# Patient Record
Sex: MALE | Race: WHITE | NOT HISPANIC OR LATINO | Employment: OTHER | URBAN - METROPOLITAN AREA
[De-identification: names, ages, dates, MRNs, and addresses within clinical notes are randomized per-mention and may not be internally consistent; named-entity substitution may affect disease eponyms.]

---

## 2017-07-31 ENCOUNTER — APPOINTMENT (EMERGENCY)
Dept: RADIOLOGY | Facility: HOSPITAL | Age: 82
End: 2017-07-31
Payer: MEDICARE

## 2017-07-31 ENCOUNTER — HOSPITAL ENCOUNTER (EMERGENCY)
Facility: HOSPITAL | Age: 82
Discharge: HOME | End: 2017-07-31
Attending: EMERGENCY MEDICINE
Payer: MEDICARE

## 2017-07-31 VITALS
RESPIRATION RATE: 18 BRPM | DIASTOLIC BLOOD PRESSURE: 80 MMHG | TEMPERATURE: 98.2 F | SYSTOLIC BLOOD PRESSURE: 142 MMHG | OXYGEN SATURATION: 98 % | HEART RATE: 76 BPM | WEIGHT: 143.96 LBS
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
## 2017-07-31 DIAGNOSIS — S70.00XA CONTUSION, HIP: ICD-10-CM

## 2017-07-31 DIAGNOSIS — S46.919A SHOULDER STRAIN: ICD-10-CM

## 2017-07-31 PROCEDURE — 99284 EMERGENCY DEPT VISIT MOD MDM: CPT

## 2017-07-31 PROCEDURE — 72100 X-RAY EXAM L-S SPINE 2/3 VWS: CPT

## 2017-07-31 PROCEDURE — 73030 X-RAY EXAM OF SHOULDER: CPT

## 2017-07-31 PROCEDURE — 72170 X-RAY EXAM OF PELVIS: CPT

## 2017-07-31 RX ORDER — OXYCODONE HYDROCHLORIDE AND ACETAMINOPHEN 5; 325 MG/1; MG/1
1 TABLET ORAL EVERY 8 HOURS PRN
Qty: 12 TABLET | Refills: 0 | Status: SHIPPED | OUTPATIENT
Start: 2017-07-31 | End: 2017-08-10

## 2017-07-31 RX ORDER — OXYCODONE HYDROCHLORIDE AND ACETAMINOPHEN 5; 325 MG/1; MG/1
1 TABLET ORAL ONCE
Status: COMPLETED | OUTPATIENT
Start: 2017-07-31 | End: 2017-07-31

## 2017-07-31 RX ADMIN — OXYCODONE HYDROCHLORIDE AND ACETAMINOPHEN 1 TABLET: 5; 325 TABLET ORAL at 19:41

## 2017-08-17 ENCOUNTER — APPOINTMENT (EMERGENCY)
Dept: CT IMAGING | Facility: HOSPITAL | Age: 82
End: 2017-08-17
Payer: MEDICARE

## 2017-08-17 ENCOUNTER — HOSPITAL ENCOUNTER (EMERGENCY)
Facility: HOSPITAL | Age: 82
Discharge: HOME | End: 2017-08-17
Attending: EMERGENCY MEDICINE | Admitting: EMERGENCY MEDICINE
Payer: MEDICARE

## 2017-08-17 VITALS
BODY MASS INDEX: 24.22 KG/M2 | RESPIRATION RATE: 18 BRPM | OXYGEN SATURATION: 99 % | HEART RATE: 79 BPM | WEIGHT: 159.83 LBS | TEMPERATURE: 97.9 F | DIASTOLIC BLOOD PRESSURE: 91 MMHG | HEIGHT: 68 IN | SYSTOLIC BLOOD PRESSURE: 188 MMHG

## 2017-08-17 DIAGNOSIS — S09.90XA CLOSED HEAD INJURY, INITIAL ENCOUNTER: ICD-10-CM
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
## 2017-08-17 PROCEDURE — 99284 EMERGENCY DEPT VISIT MOD MDM: CPT

## 2017-08-17 PROCEDURE — 70450 CT HEAD/BRAIN W/O DYE: CPT

## 2017-08-17 RX ORDER — MEMANTINE HYDROCHLORIDE 5 MG/1
10 TABLET ORAL
COMMUNITY
End: 2017-08-18

## 2017-08-17 RX ORDER — ASPIRIN 81 MG/1
81 TABLET, CHEWABLE ORAL DAILY
COMMUNITY
End: 2017-10-27 | Stop reason: HOSPADM

## 2017-08-17 RX ORDER — ESOMEPRAZOLE MAGNESIUM 40 MG/1
40 CAPSULE, DELAYED RELEASE ORAL
COMMUNITY

## 2017-08-17 RX ORDER — PRAVASTATIN SODIUM 40 MG
40 TABLET ORAL DAILY
COMMUNITY

## 2017-08-17 RX ORDER — MEMANTINE HYDROCHLORIDE 5 MG/1
5 TABLET ORAL DAILY
COMMUNITY
End: 2017-08-18

## 2017-08-18 ENCOUNTER — HOSPITAL ENCOUNTER (EMERGENCY)
Facility: HOSPITAL | Age: 82
Discharge: HOME | End: 2017-08-18
Attending: EMERGENCY MEDICINE | Admitting: EMERGENCY MEDICINE
Payer: MEDICARE

## 2017-08-18 ENCOUNTER — APPOINTMENT (EMERGENCY)
Dept: RADIOLOGY | Facility: HOSPITAL | Age: 82
End: 2017-08-18
Payer: MEDICARE

## 2017-08-18 ENCOUNTER — APPOINTMENT (EMERGENCY)
Dept: CT IMAGING | Facility: HOSPITAL | Age: 82
End: 2017-08-18
Payer: MEDICARE

## 2017-08-18 VITALS
TEMPERATURE: 97.8 F | RESPIRATION RATE: 16 BRPM | DIASTOLIC BLOOD PRESSURE: 28 MMHG | SYSTOLIC BLOOD PRESSURE: 138 MMHG | BODY MASS INDEX: 21.52 KG/M2 | OXYGEN SATURATION: 97 % | WEIGHT: 141.54 LBS | HEART RATE: 94 BPM

## 2017-08-18 DIAGNOSIS — T14.8XXA INTRAMUSCULAR HEMATOMA: Primary | ICD-10-CM

## 2017-08-18 LAB
ALBUMIN SERPL BCP-MCNC: 3.2 G/DL (ref 3.5–5)
ALP SERPL-CCNC: 49 U/L (ref 46–116)
ALT SERPL W P-5'-P-CCNC: 21 U/L (ref 12–78)
ANION GAP SERPL CALCULATED.3IONS-SCNC: 7 MMOL/L (ref 4–13)
AST SERPL W P-5'-P-CCNC: 21 U/L (ref 5–45)
BACTERIA UR QL AUTO: ABNORMAL /HPF
BASOPHILS # BLD AUTO: 0.02 THOUSANDS/ΜL (ref 0–0.1)
BASOPHILS NFR BLD AUTO: 0 % (ref 0–1)
BILIRUB SERPL-MCNC: 0.6 MG/DL (ref 0.2–1)
BILIRUB UR QL STRIP: NEGATIVE
BUN SERPL-MCNC: 19 MG/DL (ref 5–25)
CALCIUM SERPL-MCNC: 8.5 MG/DL (ref 8.3–10.1)
CHLORIDE SERPL-SCNC: 104 MMOL/L (ref 100–108)
CLARITY UR: CLEAR
CO2 SERPL-SCNC: 30 MMOL/L (ref 21–32)
COLOR UR: YELLOW
CREAT SERPL-MCNC: 0.82 MG/DL (ref 0.6–1.3)
EOSINOPHIL # BLD AUTO: 0.01 THOUSAND/ΜL (ref 0–0.61)
EOSINOPHIL NFR BLD AUTO: 0 % (ref 0–6)
ERYTHROCYTE [DISTWIDTH] IN BLOOD BY AUTOMATED COUNT: 13.4 % (ref 11.6–15.1)
GFR SERPL CREATININE-BSD FRML MDRD: 84 ML/MIN/1.73SQ M
GLUCOSE SERPL-MCNC: 110 MG/DL (ref 65–140)
GLUCOSE UR STRIP-MCNC: NEGATIVE MG/DL
HCT VFR BLD AUTO: 36.7 % (ref 36.5–49.3)
HGB BLD-MCNC: 12.2 G/DL (ref 12–17)
HGB UR QL STRIP.AUTO: ABNORMAL
KETONES UR STRIP-MCNC: ABNORMAL MG/DL
LEUKOCYTE ESTERASE UR QL STRIP: NEGATIVE
LYMPHOCYTES # BLD AUTO: 1.65 THOUSANDS/ΜL (ref 0.6–4.47)
LYMPHOCYTES NFR BLD AUTO: 14 % (ref 14–44)
MCH RBC QN AUTO: 29.7 PG (ref 26.8–34.3)
MCHC RBC AUTO-ENTMCNC: 33.2 G/DL (ref 31.4–37.4)
MCV RBC AUTO: 89 FL (ref 82–98)
MONOCYTES # BLD AUTO: 0.88 THOUSAND/ΜL (ref 0.17–1.22)
MONOCYTES NFR BLD AUTO: 7 % (ref 4–12)
NEUTROPHILS # BLD AUTO: 9.44 THOUSANDS/ΜL (ref 1.85–7.62)
NEUTS SEG NFR BLD AUTO: 79 % (ref 43–75)
NITRITE UR QL STRIP: NEGATIVE
NON-SQ EPI CELLS URNS QL MICRO: ABNORMAL /HPF
PH UR STRIP.AUTO: 6.5 [PH] (ref 4.5–8)
PLATELET # BLD AUTO: 240 THOUSANDS/UL (ref 149–390)
PMV BLD AUTO: 10.2 FL (ref 8.9–12.7)
POTASSIUM SERPL-SCNC: 3.9 MMOL/L (ref 3.5–5.3)
PROT SERPL-MCNC: 6.9 G/DL (ref 6.4–8.2)
PROT UR STRIP-MCNC: NEGATIVE MG/DL
RBC # BLD AUTO: 4.11 MILLION/UL (ref 3.88–5.62)
RBC #/AREA URNS AUTO: ABNORMAL /HPF
SODIUM SERPL-SCNC: 141 MMOL/L (ref 136–145)
SP GR UR STRIP.AUTO: 1.01 (ref 1–1.03)
TROPONIN I SERPL-MCNC: <0.02 NG/ML
UROBILINOGEN UR QL STRIP.AUTO: 0.2 E.U./DL
WBC # BLD AUTO: 12 THOUSAND/UL (ref 4.31–10.16)
WBC #/AREA URNS AUTO: ABNORMAL /HPF

## 2017-08-18 PROCEDURE — 73502 X-RAY EXAM HIP UNI 2-3 VIEWS: CPT

## 2017-08-18 PROCEDURE — 36415 COLL VENOUS BLD VENIPUNCTURE: CPT | Performed by: EMERGENCY MEDICINE

## 2017-08-18 PROCEDURE — 84484 ASSAY OF TROPONIN QUANT: CPT | Performed by: EMERGENCY MEDICINE

## 2017-08-18 PROCEDURE — 71010 HB CHEST X-RAY 1 VIEW FRONTAL: CPT

## 2017-08-18 PROCEDURE — 85025 COMPLETE CBC W/AUTO DIFF WBC: CPT | Performed by: EMERGENCY MEDICINE

## 2017-08-18 PROCEDURE — 80053 COMPREHEN METABOLIC PANEL: CPT | Performed by: EMERGENCY MEDICINE

## 2017-08-18 PROCEDURE — 99285 EMERGENCY DEPT VISIT HI MDM: CPT

## 2017-08-18 PROCEDURE — 73552 X-RAY EXAM OF FEMUR 2/>: CPT

## 2017-08-18 PROCEDURE — 81001 URINALYSIS AUTO W/SCOPE: CPT | Performed by: EMERGENCY MEDICINE

## 2017-08-18 PROCEDURE — 73700 CT LOWER EXTREMITY W/O DYE: CPT

## 2017-08-18 PROCEDURE — 73030 X-RAY EXAM OF SHOULDER: CPT

## 2017-08-18 RX ORDER — FENTANYL CITRATE 50 UG/ML
25 INJECTION, SOLUTION INTRAMUSCULAR; INTRAVENOUS ONCE
Status: DISCONTINUED | OUTPATIENT
Start: 2017-08-18 | End: 2017-08-18

## 2017-08-18 RX ORDER — MEMANTINE HYDROCHLORIDE 10 MG/1
10 TABLET ORAL 2 TIMES DAILY
COMMUNITY

## 2017-08-18 RX ORDER — ACETAMINOPHEN 325 MG/1
650 TABLET ORAL ONCE
Status: COMPLETED | OUTPATIENT
Start: 2017-08-18 | End: 2017-08-18

## 2017-08-18 RX ADMIN — ACETAMINOPHEN 650 MG: 325 TABLET ORAL at 12:16

## 2017-10-23 ENCOUNTER — HOSPITAL ENCOUNTER (EMERGENCY)
Facility: HOSPITAL | Age: 82
End: 2017-10-23
Attending: EMERGENCY MEDICINE
Payer: MEDICARE

## 2017-10-23 ENCOUNTER — APPOINTMENT (EMERGENCY)
Dept: CT IMAGING | Facility: HOSPITAL | Age: 82
End: 2017-10-23
Payer: MEDICARE

## 2017-10-23 ENCOUNTER — HOSPITAL ENCOUNTER (INPATIENT)
Facility: HOSPITAL | Age: 82
LOS: 4 days | Discharge: NURSING, REHAB OR LONG-TERM CARE | DRG: 086 | End: 2017-10-27
Attending: SURGERY | Admitting: SURGERY
Payer: MEDICARE

## 2017-10-23 VITALS
DIASTOLIC BLOOD PRESSURE: 80 MMHG | BODY MASS INDEX: 23.36 KG/M2 | OXYGEN SATURATION: 98 % | RESPIRATION RATE: 20 BRPM | TEMPERATURE: 97.8 F | WEIGHT: 153.66 LBS | HEART RATE: 78 BPM | SYSTOLIC BLOOD PRESSURE: 175 MMHG
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
## 2017-10-23 DIAGNOSIS — E46 PROTEIN CALORIE MALNUTRITION (HCC): ICD-10-CM

## 2017-10-23 DIAGNOSIS — I61.5 INTRAVENTRICULAR HEMORRHAGE (HCC): Primary | ICD-10-CM
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
## 2017-10-23 DIAGNOSIS — S00.03XA CONTUSION OF LEFT TEMPOROFRONTAL SCALP, INITIAL ENCOUNTER: ICD-10-CM

## 2017-10-23 PROBLEM — I10 HTN (HYPERTENSION): Status: ACTIVE | Noted: 2017-10-23

## 2017-10-23 PROBLEM — E78.5 HLD (HYPERLIPIDEMIA): Status: ACTIVE | Noted: 2017-10-23

## 2017-10-23 PROBLEM — Z79.82 ON ASPIRIN AT HOME: Status: ACTIVE | Noted: 2017-10-23

## 2017-10-23 PROBLEM — F03.90 DEMENTIA (HCC): Status: ACTIVE | Noted: 2017-10-23

## 2017-10-23 LAB
ANION GAP SERPL CALCULATED.3IONS-SCNC: 8 MMOL/L (ref 4–13)
APTT PPP: 24 SECONDS (ref 23–35)
BASOPHILS # BLD AUTO: 0.02 THOUSANDS/ΜL (ref 0–0.1)
BASOPHILS NFR BLD AUTO: 0 % (ref 0–1)
BUN SERPL-MCNC: 16 MG/DL (ref 5–25)
CALCIUM SERPL-MCNC: 8.8 MG/DL (ref 8.3–10.1)
CHLORIDE SERPL-SCNC: 103 MMOL/L (ref 100–108)
CO2 SERPL-SCNC: 32 MMOL/L (ref 21–32)
CREAT SERPL-MCNC: 0.94 MG/DL (ref 0.6–1.3)
EOSINOPHIL # BLD AUTO: 0.19 THOUSAND/ΜL (ref 0–0.61)
EOSINOPHIL NFR BLD AUTO: 3 % (ref 0–6)
ERYTHROCYTE [DISTWIDTH] IN BLOOD BY AUTOMATED COUNT: 13 % (ref 11.6–15.1)
GFR SERPL CREATININE-BSD FRML MDRD: 76 ML/MIN/1.73SQ M
GLUCOSE SERPL-MCNC: 109 MG/DL (ref 65–140)
HCT VFR BLD AUTO: 43 % (ref 36.5–49.3)
HGB BLD-MCNC: 14.2 G/DL (ref 12–17)
HOLD SPECIMEN: NORMAL
INR PPP: 1 (ref 0.86–1.16)
LYMPHOCYTES # BLD AUTO: 1.31 THOUSANDS/ΜL (ref 0.6–4.47)
LYMPHOCYTES NFR BLD AUTO: 23 % (ref 14–44)
MCH RBC QN AUTO: 30.1 PG (ref 26.8–34.3)
MCHC RBC AUTO-ENTMCNC: 33 G/DL (ref 31.4–37.4)
MCV RBC AUTO: 91 FL (ref 82–98)
MONOCYTES # BLD AUTO: 0.43 THOUSAND/ΜL (ref 0.17–1.22)
MONOCYTES NFR BLD AUTO: 7 % (ref 4–12)
NEUTROPHILS # BLD AUTO: 3.88 THOUSANDS/ΜL (ref 1.85–7.62)
NEUTS SEG NFR BLD AUTO: 67 % (ref 43–75)
PLATELET # BLD AUTO: 200 THOUSANDS/UL (ref 149–390)
PMV BLD AUTO: 10.5 FL (ref 8.9–12.7)
POTASSIUM SERPL-SCNC: 4 MMOL/L (ref 3.5–5.3)
PROTHROMBIN TIME: 13.2 SECONDS (ref 12.1–14.4)
RBC # BLD AUTO: 4.72 MILLION/UL (ref 3.88–5.62)
SODIUM SERPL-SCNC: 143 MMOL/L (ref 136–145)
WBC # BLD AUTO: 5.83 THOUSAND/UL (ref 4.31–10.16)

## 2017-10-23 PROCEDURE — 85610 PROTHROMBIN TIME: CPT | Performed by: STUDENT IN AN ORGANIZED HEALTH CARE EDUCATION/TRAINING PROGRAM

## 2017-10-23 PROCEDURE — 80048 BASIC METABOLIC PNL TOTAL CA: CPT | Performed by: EMERGENCY MEDICINE

## 2017-10-23 PROCEDURE — 99285 EMERGENCY DEPT VISIT HI MDM: CPT

## 2017-10-23 PROCEDURE — 85730 THROMBOPLASTIN TIME PARTIAL: CPT | Performed by: STUDENT IN AN ORGANIZED HEALTH CARE EDUCATION/TRAINING PROGRAM

## 2017-10-23 PROCEDURE — 72125 CT NECK SPINE W/O DYE: CPT

## 2017-10-23 PROCEDURE — G0008 ADMIN INFLUENZA VIRUS VAC: HCPCS | Performed by: EMERGENCY MEDICINE

## 2017-10-23 PROCEDURE — 36415 COLL VENOUS BLD VENIPUNCTURE: CPT | Performed by: EMERGENCY MEDICINE

## 2017-10-23 PROCEDURE — 85025 COMPLETE CBC W/AUTO DIFF WBC: CPT | Performed by: EMERGENCY MEDICINE

## 2017-10-23 PROCEDURE — 96365 THER/PROPH/DIAG IV INF INIT: CPT

## 2017-10-23 PROCEDURE — 93005 ELECTROCARDIOGRAM TRACING: CPT | Performed by: EMERGENCY MEDICINE

## 2017-10-23 PROCEDURE — 87081 CULTURE SCREEN ONLY: CPT | Performed by: STUDENT IN AN ORGANIZED HEALTH CARE EDUCATION/TRAINING PROGRAM

## 2017-10-23 PROCEDURE — 70450 CT HEAD/BRAIN W/O DYE: CPT

## 2017-10-23 PROCEDURE — 90715 TDAP VACCINE 7 YRS/> IM: CPT | Performed by: EMERGENCY MEDICINE

## 2017-10-23 PROCEDURE — 90686 IIV4 VACC NO PRSV 0.5 ML IM: CPT | Performed by: EMERGENCY MEDICINE

## 2017-10-23 RX ORDER — ACETAMINOPHEN 160 MG/5ML
650 SUSPENSION, ORAL (FINAL DOSE FORM) ORAL ONCE
Status: COMPLETED | OUTPATIENT
Start: 2017-10-23 | End: 2017-10-23

## 2017-10-23 RX ORDER — MEMANTINE HYDROCHLORIDE 5 MG/1
10 TABLET ORAL 2 TIMES DAILY
Status: DISCONTINUED | OUTPATIENT
Start: 2017-10-23 | End: 2017-10-27 | Stop reason: HOSPADM

## 2017-10-23 RX ORDER — CHLORHEXIDINE GLUCONATE 0.12 MG/ML
15 RINSE ORAL EVERY 12 HOURS SCHEDULED
Status: DISCONTINUED | OUTPATIENT
Start: 2017-10-23 | End: 2017-10-24

## 2017-10-23 RX ORDER — PANTOPRAZOLE SODIUM 40 MG/1
40 TABLET, DELAYED RELEASE ORAL
Status: DISCONTINUED | OUTPATIENT
Start: 2017-10-24 | End: 2017-10-24

## 2017-10-23 RX ORDER — OLANZAPINE 10 MG/1
2.5 INJECTION, POWDER, LYOPHILIZED, FOR SOLUTION INTRAMUSCULAR ONCE
Status: COMPLETED | OUTPATIENT
Start: 2017-10-23 | End: 2017-10-23

## 2017-10-23 RX ORDER — METOPROLOL TARTRATE 5 MG/5ML
5 INJECTION INTRAVENOUS EVERY 6 HOURS PRN
Status: DISCONTINUED | OUTPATIENT
Start: 2017-10-23 | End: 2017-10-27 | Stop reason: HOSPADM

## 2017-10-23 RX ORDER — PRAVASTATIN SODIUM 40 MG
40 TABLET ORAL
Status: DISCONTINUED | OUTPATIENT
Start: 2017-10-23 | End: 2017-10-27 | Stop reason: HOSPADM

## 2017-10-23 RX ORDER — AMOXICILLIN 250 MG
1 CAPSULE ORAL
Status: DISCONTINUED | OUTPATIENT
Start: 2017-10-23 | End: 2017-10-27 | Stop reason: HOSPADM

## 2017-10-23 RX ORDER — OLANZAPINE 10 MG/1
2.5 INJECTION, POWDER, LYOPHILIZED, FOR SOLUTION INTRAMUSCULAR ONCE
Status: DISCONTINUED | OUTPATIENT
Start: 2017-10-23 | End: 2017-10-23

## 2017-10-23 RX ORDER — OLANZAPINE 10 MG/1
2.5 INJECTION, POWDER, LYOPHILIZED, FOR SOLUTION INTRAMUSCULAR ONCE
Status: COMPLETED | OUTPATIENT
Start: 2017-10-24 | End: 2017-10-24

## 2017-10-23 RX ORDER — SODIUM CHLORIDE, SODIUM GLUCONATE, SODIUM ACETATE, POTASSIUM CHLORIDE, MAGNESIUM CHLORIDE, SODIUM PHOSPHATE, DIBASIC, AND POTASSIUM PHOSPHATE .53; .5; .37; .037; .03; .012; .00082 G/100ML; G/100ML; G/100ML; G/100ML; G/100ML; G/100ML; G/100ML
75 INJECTION, SOLUTION INTRAVENOUS CONTINUOUS
Status: DISCONTINUED | OUTPATIENT
Start: 2017-10-23 | End: 2017-10-27 | Stop reason: HOSPADM

## 2017-10-23 RX ORDER — POLYETHYLENE GLYCOL 3350 17 G/17G
17 POWDER, FOR SOLUTION ORAL DAILY
Status: DISCONTINUED | OUTPATIENT
Start: 2017-10-24 | End: 2017-10-27 | Stop reason: HOSPADM

## 2017-10-23 RX ADMIN — METOPROLOL TARTRATE 5 MG: 5 INJECTION, SOLUTION INTRAVENOUS at 22:12

## 2017-10-23 RX ADMIN — SODIUM CHLORIDE, SODIUM GLUCONATE, SODIUM ACETATE, POTASSIUM CHLORIDE, MAGNESIUM CHLORIDE, SODIUM PHOSPHATE, DIBASIC, AND POTASSIUM PHOSPHATE 75 ML/HR: .53; .5; .37; .037; .03; .012; .00082 INJECTION, SOLUTION INTRAVENOUS at 18:56

## 2017-10-23 RX ADMIN — ACETAMINOPHEN 650 MG: 160 SUSPENSION ORAL at 15:04

## 2017-10-23 RX ADMIN — LEVETIRACETAM 1500 MG: 100 INJECTION, SOLUTION INTRAVENOUS at 19:31

## 2017-10-23 RX ADMIN — TETANUS TOXOID, REDUCED DIPHTHERIA TOXOID AND ACELLULAR PERTUSSIS VACCINE, ADSORBED 0.5 ML: 5; 2.5; 8; 8; 2.5 SUSPENSION INTRAMUSCULAR at 19:38

## 2017-10-23 RX ADMIN — PRAVASTATIN SODIUM 40 MG: 40 TABLET ORAL at 19:36

## 2017-10-23 RX ADMIN — INFLUENZA VIRUS VACCINE 0.5 ML: 15; 15; 15; 15 SUSPENSION INTRAMUSCULAR at 19:39

## 2017-10-23 RX ADMIN — DESMOPRESSIN ACETATE 20.8 MCG: 4 SOLUTION INTRAVENOUS at 16:48

## 2017-10-23 RX ADMIN — MEMANTINE HYDROCHLORIDE 10 MG: 10 TABLET ORAL at 19:35

## 2017-10-23 RX ADMIN — OLANZAPINE 2.5 MG: 10 INJECTION, POWDER, FOR SOLUTION INTRAMUSCULAR at 23:18

## 2017-10-23 RX ADMIN — SODIUM CHLORIDE 2.5 MG/HR: 0.9 INJECTION, SOLUTION INTRAVENOUS at 20:32

## 2017-10-23 NOTE — EMTALA/ACUTE CARE TRANSFER
[address] [address] [address]  Dept: [phone]      EMTALA TRANSFER CONSENT    NAME Rin Corea                                         1935                              MRN [id number]    I have been informed of my rights regarding examination, treatment, and transfer   by Dr Vidya Nix MD    Benefits: Specialized equipment and/or services available at the receiving facility (Include comment)________________________    Risks: Potential for delay in receiving treatment      Transfer Request   I acknowledge that my medical condition has been evaluated and explained to me by the emergency department physician or other qualified medical person and/or my attending physician who has recommended and offered to me further medical examination and treatment  I understand the Hospital's obligation with respect to the treatment and stabilization of my emergency medical condition  I nevertheless request to be transferred  I release the Hospital, the doctor, and any other persons caring for me from all responsibility or liability for any injury or ill effects that may result from my transfer and agree to accept all responsibility for the consequences of my choice to transfer, rather than receive stabilizing treatment at the Hospital  I understand that because the transfer is my request, my insurance may not provide reimbursement for the services  The Hospital will assist and direct me and my family in how to make arrangements for transfer, but the hospital is not liable for any fees charged by the transport service  In spite of this understanding, I refuse to consent to further medical examination and treatment which has been offered to me, and request transfer to  [address] Name, [facility] : [address]    I authorize the performance of emergency medical procedures and treatments upon me in both transit and upon arrival at the receiving facility  Additionally, I authorize the release of any and all medical records to the receiving facility and request they be transported with me, if possible  I authorize the performance of emergency medical procedures and treatments upon me in both transit and upon arrival at the receiving facility  Additionally, I authorize the release of any and all medical records to the receiving facility and request they be transported with me, if possible  I understand that the safest mode of transportation during a medical emergency is an ambulance and that the Hospital advocates the use of this mode of transport  Risks of traveling to the receiving facility by car, including absence of medical control, life sustaining equipment, such as oxygen, and medical personnel has been explained to me and I fully understand them  (ENRIQUETA CORRECT BOX BELOW)  Novatus Gavi  ]  I consent to the stated transfer and to be transported by ambulance/helicopter  [  ]  I consent to the stated transfer, but refuse transportation by ambulance and accept full responsibility for my transportation by car  I understand the risks of non-ambulance transfers and I exonerate the Hospital and its staff from any deterioration in my condition that results from this refusal     X___________________________________________    DATE  10/23/17  TIME________  Signature of patient or legally responsible individual signing on patient behalf           RELATIONSHIP TO PATIENT_________________________          Provider Certification    NAME Krysta Angela                                         1935                              MRN [id number]    A medical screening exam was performed on the above named patient  Based on the examination:    Condition Necessitating Transfer The primary encounter diagnosis was Intraventricular hemorrhage ([facility] [address] )   Diagnoses of Fall, initial encounter and Contusion of left temporofrontal scalp, initial encounter were also pertinent to this visit  Patient Condition: The patient has been stabilized such that within reasonable medical probability, no material deterioration of the patient condition or the condition of the unborn child(judith) is likely to result from the transfer, An emergency transfer is being made prior to stabilization due to the need for definitive care and the benefit of transfer outweighs the risk    Reason for Transfer: Level of Care needed not available at this facility    Transfer Requirements: [address]    · Space available and qualified personnel available for treatment as acknowledged by    · Agreed to accept transfer and to provide appropriate medical treatment as acknowledged by       dr Ian Ahumada  · Appropriate medical records of the examination and treatment of the patient are provided at the time of transfer   [address] [facility], [address] _______  · Transfer will be performed by qualified personnel from    and appropriate transfer equipment as required, including the use of necessary and appropriate life support measures      Provider Certification: I have examined the patient and explained the following risks and benefits of being transferred/refusing transfer to the patient/family:  General risk, such as traffic hazards, adverse weather conditions, rough terrain or turbulence, possible failure of equipment (including vehicle or aircraft), or consequences of actions of persons outside the control of the transport personnel      Based on these reasonable risks and benefits to the patient and/or the unborn child(judith), and based upon the information available at the time of the patients examination, I certify that the medical benefits reasonably to be expected from the provision of appropriate medical treatments at another medical facility outweigh the increasing risks, if any, to the individuals medical condition, and in the case of labor to the unborn child, from effecting the transfer      X____________________________________________ DATE 10/23/17        TIME_______      ORIGINAL - SEND TO MEDICAL RECORDS   COPY - SEND WITH PATIENT DURING TRANSFER
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
## 2017-10-23 NOTE — PROGRESS NOTES
Discussed case with Dr Eriberto Fernández of Neurosurgery    Blood pressure goal of 140 or less, will start Cardene drip for possible hemorrhagic bleed
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
## 2017-10-23 NOTE — PROGRESS NOTES
Progress Note - Critical Care   Anna Tinajero [de-identified] y o  male MRN: [id number]  Unit/Bed#: ED 01 Encounter: [id number]    Attending Physician: Capo Barkley, DO      ______________________________________________________________________  Assessment and Plan:   Principal Problem:    Intraventricular hemorrhage ([facility] [address] )  Active Problems:    Dementia    HTN (hypertension)    Fall from standing    Scalp hematoma    On aspirin at home    HLD (hyperlipidemia)    Protein calorie malnutrition ([facility] [address] )  Resolved Problems:    * No resolved hospital problems  *        Neuro:   1) intraventricular hemorrhage - loading dose of Keppra given, will f/u neurosurgery recommendations  In the meantime, Q1 neuro checks; we will likely repeat head CT in the next 24 hours  2) dementia - patient currently at baseline, continue home namenda  Frequent reorientation, delirium precautions  Geriatrics consult  CV:   1) HTN - patient not on any home meds for blood pressure  Pressures have been high, we will add lopressor 5mg PRN for goal SBP less than 180   2) HLD - continue home statin    Pulm: currently satting well on room air, continue to monitor  GI:   1) GERD - restart home PPI, replace nexium with protonix 40 daily  2) bowel regimen - senokot S and miralax    : no acute issues, monitor urine output  Patient does have a history of incontinence s/p bladder sling  F/E/N:   1) fluids - isolyte @ 75  2) electrolytes - currently no repletion necessary, will monitor and replete as necessary  3) nutrition - NPO for now, await neurosurgical plan  Would have speech evaluate before starting patient on a diet  ID: no acute issues, afebrile and normal WBC  Heme: no acute issues, holding pharmacologic DVT prophylaxis 2/2 intraventricular hemorrhage  Check coags  Endo: no history of diabetes and blood glucose normal on today's labs, continue to monitor at this time       Msk/Skin:   1) abrasion to left frontal scalp - bacitracin daily  2) frequent repositioning, OOB as tolerated    Disposition: continue critical care    Code Status: Level 3 - DNAR and DNI    Counseling / Coordination of Care  Total Critical Care time spent 30 minutes excluding procedures, teaching and family updates  ______________________________________________________________________    HPI: Patient is an 61-year-old male who presents as a trauma transfer from [facility]  Yesterday, the patient had a fall at his nursing facility and was found down by staff with unknown down time  He was not complaining of any pain at that time, and his mental status was at baseline per facility staff and family members  He presented to the ED for evaluation, and was found to have a left intraventricular hemorrhage on CT scan  Patient has GCS of 14 with some confusion  ______________________________________________________________________    Physical Exam:   Physical Exam   Constitutional: He appears well-developed and well-nourished  No distress  HENT:   L frontal abrasion 2x2cm   Eyes: EOM are normal  Pupils are equal, round, and reactive to light  No scleral icterus  Neck: Normal range of motion  Neck supple  No JVD present  Cardiovascular: Normal rate, regular rhythm, normal heart sounds and intact distal pulses  Exam reveals no gallop and no friction rub  No murmur heard  Pulmonary/Chest: Effort normal and breath sounds normal  No respiratory distress  Abdominal: Soft  He exhibits no distension  There is no tenderness  There is no rebound and no guarding  Neurological: He is alert  No cranial nerve deficit  GCS 14 (E4 V4 M6), -1 for confusion  Alert and oriented to person only, not to place or time - at baseline per son Juan Jose Pathak at bedside   Skin: Skin is warm and dry   He is not diaphoretic          ______________________________________________________________________  Vitals:    10/23/17 1815 10/23/17 1830   BP: (!) 175 (!) 175   Pulse: 72 72   Resp:  TempSrc: Oral    SpO2: 98% 97%       Temperature:   Temp (24hrs), Av 8 °F (36 6 °C), Min:97 8 °F (36 6 °C), Max:97 8 °F (36 6 °C)    Current    Weights: There is no height or weight on file to calculate BMI  Weight (last 2 days)     None        Hemodynamic Monitoring:  N/A     Non-Invasive/Invasive Ventilation Settings:  Respiratory    Lab Data (Last 4 hours)    None         O2/Vent Data (Last 4 hours)    None              No results found for: PHART, MGL2NLH, PO2ART, RKF9IZD, T6WNRIIT, BEART, SOURCE  SpO2: SpO2: 98 %  Intake and Outputs:  I/O     None        Nutrition:        Diet Orders            Start     Ordered    10/23/17 1833  Diet NPO  Diet effective now     Question Answer Comment   Diet Type NPO    RD to adjust diet per protocol?  No        10/23/17 1837          Labs:     Results from last 7 days  Lab Units 10/23/17  1629   WBC Thousand/uL 5 83   HEMOGLOBIN g/dL 14 2   HEMATOCRIT % 43 0   PLATELETS Thousands/uL 200   NEUTROS PCT % 67   MONOS PCT % 7       Results from last 7 days  Lab Units 10/23/17  1629   SODIUM mmol/L 143   POTASSIUM mmol/L 4 0   CHLORIDE mmol/L 103   CO2 mmol/L 32   BUN mg/dL 16   CREATININE mg/dL 0 94   CALCIUM mg/dL 8 8   GLUCOSE RANDOM mg/dL 109         No results found for: PHOS         0  Lab Value Date/Time   TROPONINI <0 02 2017 1142         ABG:No results found for: PHART, TAT9ASN, PO2ART, AVA3BNI, D6BRKPPW, BEART, SOURCE  Imaging: 10/23 CTH:IMPRESSION:     Compared to the 2017 head CT, there is a new 0 8 x 0 8 x 0 8 cm hyperdense focus predominantly in the left lateral ventricle but possibly arising from the corpus callosum (series 2 image 29 of series 400 image 59 )  There is also additional strands   acute hemorrhagic material in the left lateral ventriclealong the left choroid plexus (series 2 images 23 )      Because isolated intraventricular hemorrhage would be unusual in the setting of trauma, the round hyperdense foci could be a hemorrhagic lesion such as AVM  Recommend contrast enhanced MRI of the brain  I have personally reviewed pertinent reports  EKG: normal sinus  Micro:  No results found for: Seretha Foster, SPUTUMCULTUR  Allergies: Allergies   Allergen Reactions    Codeine     Pollen Extract     Sulfa Antibiotics      Medications:   Scheduled Meds:  chlorhexidine 15 mL Swish & Spit Q12H [facility]   levETIRAcetam 1,500 mg Intravenous Once   memantine 10 mg Oral BID   [START ON 10/24/2017] pantoprazole 40 mg Oral Early Morning   pravastatin 40 mg Oral Daily With Dinner   tetanus-diphtheria-acellular pertussis 0 5 mL Intramuscular Once     Continuous Infusions:  multi-electrolyte 75 mL/hr Last Rate: 75 mL/hr (10/23/17 7367)     PRN Meds:    influenza vaccine 0 5 mL Prior to discharge     VTE Pharmacologic Prophylaxis: Pharmacologic VTE Prophylaxis contraindicated due to IVH  VTE Mechanical Prophylaxis: sequential compression device  Invasive lines and devices: Invasive Devices     Peripheral Intravenous Line            Peripheral IV 10/23/17 Right Antecubital less than 1 day                     Portions of the record may have been created with voice recognition software  Occasional wrong word or "sound a like" substitutions may have occurred due to the inherent limitations of voice recognition software  Read the chart carefully and recognize, using context, where substitutions have occurred      Steve Sarmiento MD

## 2017-10-24 PROBLEM — R32 INCONTINENCE: Status: ACTIVE | Noted: 2017-10-24

## 2017-10-24 PROBLEM — R41.0 DELIRIUM: Status: ACTIVE | Noted: 2017-10-24

## 2017-10-24 PROBLEM — R53.81 PHYSICAL DECONDITIONING: Status: ACTIVE | Noted: 2017-10-24

## 2017-10-24 PROBLEM — R26.2 AMBULATORY DYSFUNCTION: Status: ACTIVE | Noted: 2017-10-24

## 2017-10-24 LAB
ANION GAP SERPL CALCULATED.3IONS-SCNC: 8 MMOL/L (ref 4–13)
ATRIAL RATE: 59 BPM
BASOPHILS # BLD AUTO: 0.02 THOUSANDS/ΜL (ref 0–0.1)
BASOPHILS NFR BLD AUTO: 0 % (ref 0–1)
BUN SERPL-MCNC: 10 MG/DL (ref 5–25)
CALCIUM SERPL-MCNC: 8.7 MG/DL (ref 8.3–10.1)
CHLORIDE SERPL-SCNC: 103 MMOL/L (ref 100–108)
CO2 SERPL-SCNC: 30 MMOL/L (ref 21–32)
CREAT SERPL-MCNC: 0.74 MG/DL (ref 0.6–1.3)
EOSINOPHIL # BLD AUTO: 0.08 THOUSAND/ΜL (ref 0–0.61)
EOSINOPHIL NFR BLD AUTO: 1 % (ref 0–6)
ERYTHROCYTE [DISTWIDTH] IN BLOOD BY AUTOMATED COUNT: 12.9 % (ref 11.6–15.1)
GFR SERPL CREATININE-BSD FRML MDRD: 87 ML/MIN/1.73SQ M
GLUCOSE SERPL-MCNC: 105 MG/DL (ref 65–140)
GLUCOSE SERPL-MCNC: 129 MG/DL (ref 65–140)
HCT VFR BLD AUTO: 42.2 % (ref 36.5–49.3)
HGB BLD-MCNC: 14.5 G/DL (ref 12–17)
LYMPHOCYTES # BLD AUTO: 1.23 THOUSANDS/ΜL (ref 0.6–4.47)
LYMPHOCYTES NFR BLD AUTO: 11 % (ref 14–44)
MCH RBC QN AUTO: 30.9 PG (ref 26.8–34.3)
MCHC RBC AUTO-ENTMCNC: 34.4 G/DL (ref 31.4–37.4)
MCV RBC AUTO: 90 FL (ref 82–98)
MONOCYTES # BLD AUTO: 1.05 THOUSAND/ΜL (ref 0.17–1.22)
MONOCYTES NFR BLD AUTO: 9 % (ref 4–12)
NEUTROPHILS # BLD AUTO: 8.88 THOUSANDS/ΜL (ref 1.85–7.62)
NEUTS SEG NFR BLD AUTO: 79 % (ref 43–75)
NRBC BLD AUTO-RTO: 0 /100 WBCS
P AXIS: 60 DEGREES
PLATELET # BLD AUTO: 199 THOUSANDS/UL (ref 149–390)
PMV BLD AUTO: 10.6 FL (ref 8.9–12.7)
POTASSIUM SERPL-SCNC: 3.3 MMOL/L (ref 3.5–5.3)
PR INTERVAL: 156 MS
QRS AXIS: 49 DEGREES
QRSD INTERVAL: 82 MS
QT INTERVAL: 420 MS
QTC INTERVAL: 415 MS
RBC # BLD AUTO: 4.7 MILLION/UL (ref 3.88–5.62)
SODIUM SERPL-SCNC: 141 MMOL/L (ref 136–145)
T WAVE AXIS: 46 DEGREES
VENTRICULAR RATE: 59 BPM
WBC # BLD AUTO: 11.3 THOUSAND/UL (ref 4.31–10.16)

## 2017-10-24 PROCEDURE — G8979 MOBILITY GOAL STATUS: HCPCS

## 2017-10-24 PROCEDURE — 82948 REAGENT STRIP/BLOOD GLUCOSE: CPT

## 2017-10-24 PROCEDURE — 97163 PT EVAL HIGH COMPLEX 45 MIN: CPT

## 2017-10-24 PROCEDURE — 85025 COMPLETE CBC W/AUTO DIFF WBC: CPT | Performed by: STUDENT IN AN ORGANIZED HEALTH CARE EDUCATION/TRAINING PROGRAM

## 2017-10-24 PROCEDURE — G8978 MOBILITY CURRENT STATUS: HCPCS

## 2017-10-24 PROCEDURE — 80048 BASIC METABOLIC PNL TOTAL CA: CPT | Performed by: STUDENT IN AN ORGANIZED HEALTH CARE EDUCATION/TRAINING PROGRAM

## 2017-10-24 RX ORDER — POTASSIUM CHLORIDE 14.9 MG/ML
20 INJECTION INTRAVENOUS ONCE
Status: COMPLETED | OUTPATIENT
Start: 2017-10-24 | End: 2017-10-24

## 2017-10-24 RX ORDER — FAMOTIDINE 20 MG/1
20 TABLET, FILM COATED ORAL
Status: DISCONTINUED | OUTPATIENT
Start: 2017-10-24 | End: 2017-10-27 | Stop reason: HOSPADM

## 2017-10-24 RX ORDER — ACETAMINOPHEN 325 MG/1
650 TABLET ORAL EVERY 8 HOURS SCHEDULED
Status: DISCONTINUED | OUTPATIENT
Start: 2017-10-24 | End: 2017-10-24

## 2017-10-24 RX ORDER — HALOPERIDOL 5 MG/ML
5 INJECTION INTRAMUSCULAR ONCE
Status: COMPLETED | OUTPATIENT
Start: 2017-10-24 | End: 2017-10-24

## 2017-10-24 RX ORDER — ACETAMINOPHEN 325 MG/1
650 TABLET ORAL EVERY 8 HOURS SCHEDULED
Status: DISCONTINUED | OUTPATIENT
Start: 2017-10-24 | End: 2017-10-27 | Stop reason: HOSPADM

## 2017-10-24 RX ORDER — POTASSIUM CHLORIDE 29.8 MG/ML
40 INJECTION INTRAVENOUS ONCE
Status: DISCONTINUED | OUTPATIENT
Start: 2017-10-24 | End: 2017-10-24

## 2017-10-24 RX ORDER — LISINOPRIL AND HYDROCHLOROTHIAZIDE 20; 12.5 MG/1; MG/1
1 TABLET ORAL DAILY
COMMUNITY

## 2017-10-24 RX ADMIN — OLANZAPINE 2.5 MG: 10 INJECTION, POWDER, FOR SOLUTION INTRAMUSCULAR at 00:04

## 2017-10-24 RX ADMIN — POTASSIUM CHLORIDE 20 MEQ: 200 INJECTION, SOLUTION INTRAVENOUS at 10:47

## 2017-10-24 RX ADMIN — SODIUM CHLORIDE, SODIUM GLUCONATE, SODIUM ACETATE, POTASSIUM CHLORIDE, MAGNESIUM CHLORIDE, SODIUM PHOSPHATE, DIBASIC, AND POTASSIUM PHOSPHATE 75 ML/HR: .53; .5; .37; .037; .03; .012; .00082 INJECTION, SOLUTION INTRAVENOUS at 21:54

## 2017-10-24 RX ADMIN — HALOPERIDOL LACTATE 5 MG: 5 INJECTION, SOLUTION INTRAMUSCULAR at 01:51

## 2017-10-24 RX ADMIN — CHLORHEXIDINE GLUCONATE 15 ML: 1.2 RINSE ORAL at 10:48

## 2017-10-24 RX ADMIN — MEMANTINE HYDROCHLORIDE 10 MG: 10 TABLET ORAL at 18:09

## 2017-10-24 RX ADMIN — POTASSIUM CHLORIDE 20 MEQ: 200 INJECTION, SOLUTION INTRAVENOUS at 12:44

## 2017-10-24 RX ADMIN — MEMANTINE HYDROCHLORIDE 10 MG: 10 TABLET ORAL at 12:53

## 2017-10-24 RX ADMIN — FAMOTIDINE 20 MG: 20 TABLET, FILM COATED ORAL at 12:52

## 2017-10-24 RX ADMIN — POTASSIUM CHLORIDE 20 MEQ: 200 INJECTION, SOLUTION INTRAVENOUS at 16:03

## 2017-10-24 RX ADMIN — POTASSIUM CHLORIDE 20 MEQ: 200 INJECTION, SOLUTION INTRAVENOUS at 14:16

## 2017-10-24 RX ADMIN — METOPROLOL TARTRATE 5 MG: 5 INJECTION, SOLUTION INTRAVENOUS at 22:24

## 2017-10-24 NOTE — CONSULTS
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  3  Dementia  4  HTN  5  H/o prostate CA    Plan:  83y/o demented male who sustained an unwitnessed fall at a NH found to have hyerdensity on CT head c/w hemorrhage  · Exam reveals GCS10; E1, V3, M6  Refuses eye opening  Resists passive opening  FC x4  Some monosyllabic words but inappropriate  Repeat exam in afternoon GCS14    · Images reviewed personally and by attending  Final results as below  · CT head wo 10/23: age appropriate atrophy  Subcentimeter left lateral ventricular hyperdensity c/w hemorrhage  · CT cervical spine wo 10/23: No acute fracture or malignant noted  · Son was able to bring in report from prior MRI brain completed at [facility] on 5/23/17 for confusion which will be scanned in  Findings included cortical and central stable volume loss compared to 10/29/2013  No evidence of ICH  No intra or extra-axial fluid collection  No evidence of mass  Stable mild chronic small vessel ischemic disease  · Recommend CT head wo prior to discharge  Ordered for 10/25  · Continue to hold ASA  If no strong indication, consider indefinite discontinuation as patient is fall risk  · No neurosurgical intervention indicated or anticipated  No outpatient follow-up anticipated  · Will continue to follow and review CT head once completed  History of Present Illness     HPI: Mounika Del Valle is a [de-identified] y o  male with PMH including  who presents following a fall at [facility]  Patient does not offer any history  History obtained from chart  Patient was found down with left frontal hematoma and taken to [facility]  CT head completed demonstrating a left lateral ventricle hyperdensity c/w hemorrhage  For these reason, he was transferred to [facility] for ongoing medical management and neurosurgical evaluation  Currently, patient offers no complaints and offers limited verbal interaction  Review of Systems   Unable to perform ROS: Dementia (non reliable )       Historical Information   Past Medical History:   Diagnosis Date    Cancer ([address] )     prostate    Dementia     Ehrlichiosis     GERD (gastroesophageal reflux disease)     Hematuria     Hypertension     Lyme disease     Neoplasm of prostate      Past Surgical History:   Procedure Laterality Date    BLADDER SUSPENSION      HIP ARTHROPLASTY      SHOULDER SURGERY Right      History   Alcohol Use No     History   Drug Use No     History   Smoking Status    Never Smoker   Smokeless Tobacco    Never Used     History reviewed  No pertinent family history  Meds/Allergies   all current active meds have been reviewed, current meds:   Current Facility-Administered Medications   Medication Dose Route Frequency    famotidine (PEPCID) tablet 20 mg  20 mg Oral BID before lunch/dinner    memantine (NAMENDA) tablet 10 mg  10 mg Oral BID    metoprolol (LOPRESSOR) injection 5 mg  5 mg Intravenous Q6H PRN    multi-electrolyte (ISOLYTE-S PH 7 4 equivalent) IV solution  75 mL/hr Intravenous Continuous    polyethylene glycol (MIRALAX) packet 17 g  17 g Oral Daily    potassium chloride 20 mEq IVPB (premix)  20 mEq Intravenous Once    pravastatin (PRAVACHOL) tablet 40 mg  40 mg Oral Daily With Dinner    senna-docusate sodium (SENOKOT S) 8 6-50 mg per tablet 1 tablet  1 tablet Oral HS    and PTA meds:   Prior to Admission Medications   Prescriptions Last Dose Informant Patient Reported?  Taking?   aspirin 81 mg chewable tablet 10/23/2017 at Unknown time  Yes Yes   Sig: Chew 81 mg daily   esomeprazole (NexIUM) 40 MG capsule 10/23/2017 at Unknown time  Yes Yes   Sig: Take 40 mg by mouth every morning before breakfast   lisinopril-hydrochlorothiazide (PRINZIDE,ZESTORETIC) 20-12 5 MG per tablet   Yes Yes   Sig: Take 1 tablet by mouth daily   memantine (NAMENDA) 10 mg tablet 10/22/2017 at Unknown time  Yes Yes   Sig: Take 10 mg by mouth 2 (two) times a day   pravastatin (PRAVACHOL) 40 mg tablet 10/22/2017 at Unknown time  Yes Yes   Sig: Take 40 mg by mouth daily      Facility-Administered Medications: None     Allergies   Allergen Reactions    Codeine     Pollen Extract     Sulfa Antibiotics        Objective   I/O       10/22 0701 - 10/23 0700 10/23 0701 - 10/24 0700 10/24 0701 - 10/25 0700    I V  (mL/kg)  965 8 (14 2)     IV Piggyback  100     Total Intake(mL/kg)  1065 8 (15 7)     Urine (mL/kg/hr)  100 100 (0 3)    Stool  0     Total Output   100 100    Net   +965 8 -100           Unmeasured Urine Occurrence  7 x     Unmeasured Stool Occurrence  6 x           Physical Exam   Constitutional: He appears well-developed and well-nourished  No distress  HENT:   Head: Normocephalic  Left frontal abrasion/ecchymosis    Eyes: Conjunctivae are normal  Pupils are equal, round, and reactive to light  Right eye exhibits no discharge  Left eye exhibits no discharge  No scleral icterus  Neck: Neck supple  Cardiovascular: Bradycardia present  Pulmonary/Chest: Effort normal  No respiratory distress  Abdominal: Soft  He exhibits no distension  There is no tenderness  Musculoskeletal: He exhibits no tenderness  Neurological:   Reflex Scores:       Bicep reflexes are 1+ on the right side and 1+ on the left side  Brachioradialis reflexes are 1+ on the right side and 1+ on the left side  Patellar reflexes are 1+ on the right side and 1+ on the left side  Skin: Skin is warm and dry  No rash noted  Psychiatric: His speech is slurred  He is slowed   Cognition and memory are impaired  Neurologic Exam     Mental Status   Follows 1 step commands  Attention: decreased  Concentration: decreased  Speech: slurred     Cranial Nerves     CN III, IV, VI   Pupils are equal, round, and reactive to light  CN VII   Facial expression full, symmetric  CN VIII   Hearing: intact    CN XII   Tongue: not atrophic  Fasciculations: absent  Tongue deviation: none    Motor Exam   Muscle bulk: normal  Overall muscle tone: normalLimited participation  4+/5 b/l , bicep, tricep, leg extension      Sensory Exam   Not cooperative      Gait, Coordination, and Reflexes     Tremor   Resting tremor: absent  Intention tremor: absent  Action tremor: absent    Reflexes   Right brachioradialis: 1+  Left brachioradialis: 1+  Right biceps: 1+  Left biceps: 1+  Right patellar: 1+  Left patellar: 1+  Right Sommer: absent  Left Sommer: absent  Right ankle clonus: absent  Left ankle clonus: absent      Vitals:Blood pressure 110/58, pulse 58, temperature 97 7 °F (36 5 °C), temperature source Oral, resp  rate 14, height 5' 9" (1 753 m), weight 68 1 kg (150 lb 2 1 oz), SpO2 97 %  ,Body mass index is 22 17 kg/m²  Lab Results:     Results from last 7 days  Lab Units 10/24/17  0541 10/23/17  1629   WBC Thousand/uL 11 30* 5 83   HEMOGLOBIN g/dL 14 5 14 2   HEMATOCRIT % 42 2 43 0   PLATELETS Thousands/uL 199 200   NEUTROS PCT % 79* 67   MONOS PCT % 9 7       Results from last 7 days  Lab Units 10/24/17  0541 10/23/17  1629   SODIUM mmol/L 141 143   POTASSIUM mmol/L 3 3* 4 0   CHLORIDE mmol/L 103 103   CO2 mmol/L 30 32   BUN mg/dL 10 16   CREATININE mg/dL 0 74 0 94   CALCIUM mg/dL 8 7 8 8   GLUCOSE RANDOM mg/dL 105 109               Results from last 7 days  Lab Units 10/23/17  2035   INR  1 00   PTT seconds 24     No results found for: TROPONINT  ABG:No results found for: PHART, NHN8MZX, PO2ART, CKH6CRE, I9QQYCXC, BEART, SOURCE    Imaging Studies: I have personally reviewed pertinent reports     and I have personally reviewed pertinent films in PACS    CT head wo 10/23: Compared to the 8/17/2017 head CT, there is a new 0 8 x 0 8 x 0 8 cm hyperdense focus predominantly in the left lateral ventricle but possibly arising from the corpus callosum (series 2 image 29 of series 400 image 59 )  There is also additional strands acute hemorrhagic material in the left lateral ventriclealong the left choroid plexus (series 2 images 23 ) Because isolated intraventricular hemorrhage would be unusual in the setting of trauma, the round hyperdense foci could be a hemorrhagic lesion such as AVM  Recommend contrast enhanced MRI of the brain  CT cervical spine wo 10/23: No cervical spine fracture or traumatic malalignment  EKG, Pathology, and Other Studies: I have personally reviewed pertinent reports  VTE Prophylaxis: Sequential compression device (Venodyne)  and Reason for no pharmacologic prophylaxis venrticular hemorrhage     Code Status: Level 3 - DNAR and DNI  Advance Directive and Living Will:      Power of :    POLST:      Counseling / Coordination of Care  I spent 45 minutes with the patient

## 2017-10-24 NOTE — CASE MANAGEMENT
Initial Clinical Review    Admission: Date/Time/Statement: 10/23/17 @ 1837     Orders Placed This Encounter   Procedures    Inpatient Admission     Standing Status:   Standing     Number of Occurrences:   1     Order Specific Question:   Admitting Physician     Answer:   Brandon Borja     Order Specific Question:   Level of Care     Answer:   Critical Care [15]     Order Specific Question:   Estimated length of stay     Answer:   More than 2 Midnights     Order Specific Question:   Certification     Answer:   I certify that inpatient services are medically necessary for this patient for a duration of greater than two midnights  See H&P and MD Progress Notes for additional information about the patient's course of treatment  ED: Date/Time/Mode of Arrival:   ED Arrival Information     Expected Arrival Acuity Means of Arrival Escorted By Service Admission Type    10/23/2017 18:05 10/23/2017 18:08 Immediate Ambulance [facility]) Trauma Urgent    Arrival Complaint    Ranjeet Jensen          Chief Complaint:   Chief Complaint   Patient presents with    Fall     unwitnessed fall at nursing home, patient on aspirin  small hematoma and abrasion on left temple  patient denies pain  dementia at baseline, no change in mental status per staff at nursing home       History of Illness: [de-identified] y o  male who presents with status post fall at nursing home  Fall was yesterday, on with this patient was found down  Patient sustained a hematoma to his left frontal region  Patient was able to get up with assistance, use a walker at baseline  Patient presented to [facility] can't is secondary to the hematoma and external signs of trauma  At [facility] patient received a CT head which showed a 0 8 x 0 8 x 0 8 left intraventricular hemorrhage  Patient is GCS 14 confused to time  This is baseline confirmed by son who is POA  Patient is pleasantly demented    Patient does not know what happened  Patient states he fell 2 weeks ago  Dementia limits history  Review of systems unobtainable      ED Vital Signs:   ED Triage Vitals   Temperature Pulse Respirations Blood Pressure SpO2   10/23/17 1815 10/23/17 1815 10/23/17 1815 10/23/17 1815 10/23/17 1815   98 1 °F (36 7 °C) 72 20 (!) 175/87 98 %      Temp Source Heart Rate Source Patient Position - Orthostatic VS BP Location FiO2 (%)   10/23/17 1815 10/23/17 1815 10/23/17 1815 10/23/17 1830 --   Oral Monitor Sitting Right arm       Pain Score       10/23/17 2130       No Pain        Wt Readings from Last 1 Encounters:   10/23/17 68 1 kg (150 lb 2 1 oz)       Vital Signs (abnormal): bp 171/81--188/88 then down to 104/58 today    Abnormal Labs/Diagnostic Test Results: k+ 3 3--  Wbc 11 30    ED Treatment:   Medication Administration from 10/23/2017 1805 to 10/23/2017 2117       Date/Time Order Dose Route Action Action by Comments     10/23/2017 1946 levETIRAcetam (KEPPRA) 1,500 mg in sodium chloride 0 9 % 100 mL IVPB 0 mg Intravenous Stopped Bibiana Hobson RN      10/23/2017 1931 levETIRAcetam (KEPPRA) 1,500 mg in sodium chloride 0 9 % 100 mL IVPB 1,500 mg Intravenous New Bag Bibiana Hobson RN      10/23/2017 2000 multi-electrolyte (ISOLYTE-S PH 7 4 equivalent) IV solution 75 mL/hr Intravenous Restarted Bibiana Hobson RN      10/23/2017 1931 multi-electrolyte (ISOLYTE-S PH 7 4 equivalent) IV solution 0 mL/hr Intravenous Stopped Bibiana Hobson RN for keppra     10/23/2017 1856 multi-electrolyte (ISOLYTE-S PH 7 4 equivalent) IV solution 75 mL/hr Intravenous [address] Bibiana Hobson RN      10/23/2017 1938 tetanus-diphtheria-acellular pertussis (BOOSTRIX) IM injection 0 5 mL 0 5 mL Intramuscular Given Bibiana Hobson RN      10/23/2017 1939 influenza inactivated quadrivalent vaccine (FLULAVAL) IM injection 0 5 mL 0 5 mL Intramuscular Given Bibiana Hobson RN      10/23/2017 1935 memantine (NAMENDA) tablet 10 mg 10 mg Oral Given Randi Hewitt RN      10/23/2017 1936 pravastatin (PRAVACHOL) tablet 40 mg 40 mg Oral Given Randi Hewitt RN      10/23/2017 2115 niCARdipine (CARDENE) 25 mg (STANDARD CONCENTRATION) in sodium chloride 0 9% 250 mL 7 5 mg/hr Intravenous Rate/Dose Change Lynne Dickerson RN      10/23/2017 2048 niCARdipine (CARDENE) 25 mg (STANDARD CONCENTRATION) in sodium chloride 0 9% 250 mL 5 mg/hr Intravenous Rate/Dose Change Randi Hewitt RN      10/23/2017 2032 niCARdipine (CARDENE) 25 mg (STANDARD CONCENTRATION) in sodium chloride 0 9% 250 mL 2 5 mg/hr Intravenous New Bag Randi Hewitt RN           Past Medical/Surgical History: Active Ambulatory Problems     Diagnosis Date Noted    No Active Ambulatory Problems     Resolved Ambulatory Problems     Diagnosis Date Noted    No Resolved Ambulatory Problems     Past Medical History:   Diagnosis Date    Cancer ([address] )     Dementia     Ehrlichiosis     GERD (gastroesophageal reflux disease)     Hematuria     Hypertension     Lyme disease     Neoplasm of prostate        Admitting Diagnosis: Protein calorie malnutrition ([address] ) [E46]  Injury, unspecified, initial encounter [T14 90XA]  Intraventricular hemorrhage ([address] ) [I61 5]  Unspecified multiple injuries, initial encounter [T07  XXXA]    Age/Sex: [de-identified] y o  male    Assessment/Plan: Trauma Alert: Evaluation Transfer for [address]  of Arrival: Ambulance  Trauma Team: Attending Edwardo Silva and Residents Yumiko Vee  Consultants: Neurosurgery: Dr Akua Cheney  Time Called 2670, 2975 PM         Trauma Active Problems:          Patient Active Problem List   Diagnosis    Intraventricular hemorrhage ([address] )    Dementia    HTN (hypertension)    Fall from standing    Scalp hematoma    On aspirin at home    HLD (hyperlipidemia)    Protein calorie malnutrition ([address] )            Trauma Plan:      Intraventricular hemorrhage on the left-external signs of trauma possibly traumatic versus medical, Keppra loaded for seizure prophylaxis  Patient given DDAVP at [facility] ED for aspirin reversal   GCS of 14, nonfocal, patient neurologically at baseline as per son      Dementia-GCS 15, nonfocal neurologic exam, will restart home medications PT OT, patient at nursing home level of care already, geriatrics consulted     Hypertension-restart home medication     Fall from standing-patient uses walker at home, nursing home level of care, will consult PT OT, geriatrics consult the     Speech evaluation for swallowing     Nutrition consult for protein calorie malnutrition     Disposition ICU for intraventricular hemorrhage in q 1 hour neuro checks     Chief Complaint: "I fell 2 weeks ago, I use a walker, I don't know what happened"      Assessment and Plan:   Principal Problem:    Intraventricular hemorrhage ([facility] )  Active Problems:    Dementia    HTN (hypertension)    Fall from standing    Scalp hematoma    On aspirin at home    HLD (hyperlipidemia)    Protein calorie malnutrition ([facility] )  Resolved Problems:    * No resolved hospital problems  *        Progress note 10/24   Neuro:   1) intraventricular hemorrhage - loading dose of Keppra given, awaiting neurosurgery recommendations  Continuing Q1 neurochecks, still with no focal deficits  2) dementia - patient currently at baseline, continue home namenda  3) ICU-induced delirium - patient received 5mg total zyprexa and 5mg haldol overnight  F/u geriatrics consult for recommendations for long-term care      CV:   1) HTN - received 1x 5mg lopressor, cardene drip held as SBP < 140  Continue to monitor and titrate cardene as necessary  2) HLD - continue home statin     Pulm: currently satting well on room air, continue to monitor      GI:   1) GERD - continue protonix 40 daily  2) bowel regimen - senokot S and miralax     : no acute issues, patient with chronic incontinence, has condom catheter in place   F/u UOP      F/E/N:   1) fluids - isolyte @ 125  2) electrolytes - currently no repletion necessary, f/u AM labs and replete for goal K>4, Mg>2, Phos>3  3) nutrition - NPO for now, await neurosurgical plan  Would have speech evaluate before starting patient on a diet      ID: no acute issues, afebrile and normal WBC  F/u AM labs      Heme: no acute issues, holding pharmacologic DVT prophylaxis 2/2 intraventricular hemorrhage  Coags wnl  F/u AM labs      Endo: no history of diabetes and blood glucose wnl, continue to monitor  Msk/Skin:   1) abrasion to left frontal scalp - bacitracin daily  2) frequent repositioning, OOB as tolerated     Disposition: likely to SD2 later today unless neurosurg plans intervention    Admission Orders:  Scheduled Meds:   famotidine 20 mg Oral BID before lunch/dinner   memantine 10 mg Oral BID   polyethylene glycol 17 g Oral Daily   potassium chloride 20 mEq Intravenous Once   potassium chloride 20 mEq Intravenous Once   potassium chloride 20 mEq Intravenous Once   potassium chloride 20 mEq Intravenous Once   pravastatin 40 mg Oral Daily With Dinner   senna-docusate sodium 1 tablet Oral HS     Continuous Infusions:   multi-electrolyte 75 mL/hr Last Rate: 75 mL/hr (10/23/17 2000)     PRN Meds: metoprolol x1    Neurosurgical consult--scan of the head personally reviewed  Small left intraventricular hemorrhage arising from small periventricular hyperdensity, likely hypertensive bleed  Not present on previous CT scan of the head dated August of 2017      Patient has received DDAVP  No surgical intervention anticipated  Patient is DNR/DNI  Monitor clinically and keep SBP less than 140 mm of mercury          Restraints  Dysphagia assessment  neuros q2  venodynes  MRI- pending  Npo  Consult gerontology  Pt/ot

## 2017-10-24 NOTE — PROGRESS NOTES
10/24/17 1100   Clinical Encounter Type   Visited With Family   Routine Visit Introduction   Anglican Encounters   Anglican Needs Prayer   Family Spiritual Encounters   Family Coping Open/discussion

## 2017-10-24 NOTE — PLAN OF CARE
Problem: PHYSICAL THERAPY ADULT  Goal: Performs mobility at highest level of function for planned discharge setting  See evaluation for individualized goals  Treatment/Interventions: Functional transfer training, LE strengthening/ROM, Therapeutic exercise, Endurance training, Equipment eval/education, Bed mobility, Gait training, Spoke to nursing  Equipment Recommended: Leah Antunez       See flowsheet documentation for full assessment, interventions and recommendations  Jacques Farooq   Prognosis: Fair  Problem List: Decreased strength, Decreased endurance, Impaired balance, Decreased mobility, Decreased cognition, Impaired judgement, Decreased safety awareness  Assessment: PT completed eval of an [de-identified]year old male who fell at assisted living  Pt sustained a hematoma to the L frontal region  Pt has a diagnosis of intraventricular left hemorrhage  Current medical and physical instabilities include multiple lines, telemetry, posey, chair alarm, falls risk, and a regression in functional status from baseline  PMHx is significant for dementia, prostate cancer, GERD, and HTN  PTA pt was living in assisted living and required assistance with ADLs, IADLs and verbal cueing for safe mobility  Per chart review pt ambulates with rw and has had multiple falls  During evaluation, pt required mod ax1 with sit to stand and stand to sit  Pt requires mod ax1 with ambulation (2nd person for safety with chair follow)  Pt ambulated 15 feet with rw  Pts gait demonstrated excessive shuffling and was excessively slow  Pt required constant cueing for safe ambulation  Evaluation ended with pt seated in reclining chair with chair alarm activated and posey attached  Current impairments included decreased strength, decreased balance, shuffled gait, and decreased activity tolerance  D/c recommendation to STR when medically appropriate  Pt will benefit from skilled INPT PT services this admission in order to achieve maximal function and safety  Barriers to Discharge: Inaccessible home environment     Recommendation: (S) Short-term skilled PT, Long-term skilled nursing home placement     PT - OK to Discharge: (S) Yes (to STR )    See flowsheet documentation for full assessment       Pam Connolly

## 2017-10-24 NOTE — TERTIARY TRAUMA SURVEY
Progress Note - Tertiary Trauma Survery   Alejandra Walsh [de-identified] y o  male MRN: [id number]  Unit/Bed#: ICU 03 Encounter: [id number]    Summary of Diagnosed Injuries:  Patient is an 62-year-old male with baseline dementia who had an unwitnessed fall and was found to have intraventricular hemorrhage on imaging  Clinical Plan:  Patient had ICU induced delirium for which she was given Haldol  Neuro surgery has no plans for surgical intervention at this time, with geriatric Medicine is following and suggest giving Neurontin q h s  if delirium persists  A loading dose of Keppra was given, but there are no other seizure prophylaxis requirements  Patient can be moved to a lower level of care for q 2 hours neuro checks  Mechanism of Injury: Fall    Outside Films Received: not applicable  Tertiary Exam Due on: 10/24/2017    Vitals: Blood pressure 139/67, pulse 62, temperature 98 °F (36 7 °C), temperature source Oral, resp  rate 21, height 5' 9" (1 753 m), weight 68 1 kg (150 lb 2 1 oz), SpO2 96 %  ,Body mass index is 22 17 kg/m²      CT / RADIOGRAPHS: ALL RESULTS MUST BE CONFIRMED BY FACULTY OR PRINTED REPORT    CT HEAD:  CT CHEST:    CT FACE:  CT ABDOMEN / PELVIS:   CT CERVICAL SPINE:  XR PELVIS:    CT THORACIC / LUMBAR SPINE:  CXR CHEST:    OTHER: OTHER:    OTHER:  OTHER:    OTHER: OTHER:    OTHER:  OTHER:    OTHER:  OTHER:      Consultants - List Service/ Faculty and Date: Neurosurgery, geriatric medicine, nutrition, case management    Active medications:           Current Facility-Administered Medications:     famotidine (PEPCID) tablet 20 mg, 20 mg, Oral, BID before lunch/dinner, 20 mg at 10/24/17 1252    memantine (NAMENDA) tablet 10 mg, 10 mg, Oral, BID, 10 mg at 10/24/17 1253    metoprolol (LOPRESSOR) injection 5 mg, 5 mg, Intravenous, Q6H PRN, 5 mg at 10/23/17 2212    multi-electrolyte (ISOLYTE-S PH 7 4 equivalent) IV solution, 75 mL/hr, Intravenous, Continuous, 75 mL/hr at 10/23/17 2000    polyethylene glycol (MIRALAX) packet 17 g, 17 g, Oral, Daily    potassium chloride 20 mEq IVPB (premix), 20 mEq, Intravenous, Once    potassium chloride 20 mEq IVPB (premix), 20 mEq, Intravenous, Once, 20 mEq at 10/24/17 1416    pravastatin (PRAVACHOL) tablet 40 mg, 40 mg, Oral, Daily With Dinner, 40 mg at 10/23/17 1936    senna-docusate sodium (SENOKOT S) 8 6-50 mg per tablet 1 tablet, 1 tablet, Oral, HS      Intake/Output Summary (Last 24 hours) at 10/24/17 1515  Last data filed at 10/24/17 1200   Gross per 24 hour   Intake          1615 83 ml   Output              550 ml   Net          1065 83 ml       Invasive Devices     Peripheral Intravenous Line            Peripheral IV 10/23/17 Left Antecubital less than 1 day    Peripheral IV 10/23/17 Right Antecubital less than 1 day          Drain            External Urinary Catheter Small less than 1 day                CAGE-AID Questionnaire:    Was the patient able to participate in the CAGE-AID screening questions on admission? Yes    Is the patient 65 years or older: YES:    1  Before the illness or injury that brought you to the Emergency, did you need someone to help you on a regular basis? 1=Yes   2  Since the illness or injury that brought you to the Emergency, have you needed more help than usual to take care of yourself? 1=Yes   3  Have you been hospitalized for one or more nights during the past 6 months (excluding a stay in the Emergency Department)? 1=Yes   4  In general, do you see well? 0=Yes   5  In general, do you have serious problems with your memory? 1=Yes   6  Do you take more than three different medications everyday? 1=Yes   TOTAL   5     Did you order a geriatric consult if the score was 2 or greater?: yes      1  GCS:  GCS Total:  15  2  Head:   a  Inspect and palpate SCALP for:  lac/abrasion:  Present: Abrasion present over left temporal region, b   Inspect and palpate FACE for:   swelling/ecchymosis:  None, c  Examine EYES Record: eye movement: Appropriate and d  Inspect MOUTH for:  Present  3  Neck:   b   Palpate for tenderness:  None, d   C-spine cleared radiographically:  yes and e   C-spine cleared clinically:  yes  4  Chest:   a  Inspect for lac/abrasion/hematoma/swelling:  None and b   Palpate for tenderness:  ribs/sternum/clavicle:  None  5  Abdomen/Pelvis:   a  Inspect for:    distension:  None and b   Palpate for:   tenderness/guarding:  None  6  Back (log roll with spinal immobilization unless cleared radiographically):   a  Inspect back of head/entire back for:   swelling/ecchymosis:  None and b   Palpate for tenderness and deformity:  vertebrae (T-L-S spine):  None  7  Extremities:   Lacs, abrasions, swelling, ecchymosis:  No bruising or deformity noted   Tenderness, pain with motor, instability:  None  8  Peripheral Nerves: WNL    Do NOT use the following abbreviations: DTO, gr, Jaylen, MS, MSO4, MgSO4, Nitro, QD, QID, QOD, u, , ?, ?g or trailing zeros   Always use a zero before a decimal     Labs:   CBC:   Lab Results   Component Value Date    WBC 11 30 (H) 10/24/2017    HGB 14 5 10/24/2017    HCT 42 2 10/24/2017    MCV 90 10/24/2017     10/24/2017    MCH 30 9 10/24/2017    MCHC 34 4 10/24/2017    RDW 12 9 10/24/2017    MPV 10 6 10/24/2017    NRBC 0 10/24/2017     CMP:   Lab Results   Component Value Date     10/24/2017     10/24/2017    CO2 30 10/24/2017    ANIONGAP 8 10/24/2017    BUN 10 10/24/2017    CREATININE 0 74 10/24/2017    GLUCOSE 105 10/24/2017    CALCIUM 8 7 10/24/2017    EGFR 87 10/24/2017     Phosphorus: No results found for: PHOS  Magnesium: No results found for: MG  Urinalysis: No results found for: Santiago Hernandez, SPECGRAV, PHUR, LEUKOCYTESUR, NITRITE, PROTEINUA, GLUCOSEU, KETONESU, BILIRUBINUR, BLOODU  Ionized Calcium: No results found for: CAION  Coagulation:   Lab Results   Component Value Date    INR 1 00 10/23/2017     Troponin: No results found for: TROPONINI  ABG: No results found for: PHART, DRU7XVX, PO2ART, DIS7GOV, P9ELRVHN, BEART, SOURCE

## 2017-10-24 NOTE — PROGRESS NOTES
Progress Note - Critical Care   Sharlene Ferrari [de-identified] y o  male MRN: [id number]  Unit/Bed#: ICU 03 Encounter: [id number]    Attending Physician: Henri Bosch, DO      ______________________________________________________________________  Assessment and Plan:   Principal Problem:    Intraventricular hemorrhage ([facility] [address] )  Active Problems:    Dementia    HTN (hypertension)    Fall from standing    Scalp hematoma    On aspirin at home    HLD (hyperlipidemia)    Protein calorie malnutrition ([facility] [address] )  Resolved Problems:    * No resolved hospital problems  *        Neuro:   1) intraventricular hemorrhage - loading dose of Keppra given, awaiting neurosurgery recommendations  Continuing Q1 neurochecks, still with no focal deficits  2) dementia - patient currently at baseline, continue home namenda  3) ICU-induced delirium - patient received 5mg total zyprexa and 5mg haldol overnight  F/u geriatrics consult for recommendations for long-term care  CV:   1) HTN - received 1x 5mg lopressor, cardene drip held as SBP < 140  Continue to monitor and titrate cardene as necessary  2) HLD - continue home statin    Pulm: currently satting well on room air, continue to monitor  GI:   1) GERD - continue protonix 40 daily  2) bowel regimen - senokot S and miralax    : no acute issues, patient with chronic incontinence, has condom catheter in place  F/u UOP  F/E/N:   1) fluids - isolyte @ 125  2) electrolytes - currently no repletion necessary, f/u AM labs and replete for goal K>4, Mg>2, Phos>3  3) nutrition - NPO for now, await neurosurgical plan  Would have speech evaluate before starting patient on a diet  ID: no acute issues, afebrile and normal WBC  F/u AM labs  Heme: no acute issues, holding pharmacologic DVT prophylaxis 2/2 intraventricular hemorrhage  Coags wnl  F/u AM labs  Endo: no history of diabetes and blood glucose wnl, continue to monitor       Msk/Skin:   1) abrasion to left frontal scalp - bacitracin daily  2) frequent repositioning, OOB as tolerated    Disposition: likely to SD2 later today unless neurosurg plans intervention    Code Status: Level 3 - DNAR and DNI    Counseling / Coordination of Care  Total Critical Care time spent 30 minutes excluding procedures, teaching and family updates  ______________________________________________________________________    Chief Complaint: none    Last 24 hours: Overnight patient was confused and disorientated, pulling at lines and trying to get out of bed  Received 5 zyprexa and 5 haldol  Cardene drip currently held as BP<140, received 5mg lopressor yesterday evening     ______________________________________________________________________    Physical Exam:   Physical Exam   Constitutional: He appears well-developed and well-nourished  No distress  HENT:   L frontal abrasion 2x2cm   Eyes: EOM are normal  Pupils are equal, round, and reactive to light  No scleral icterus  Neck: Normal range of motion  Neck supple  No JVD present  Cardiovascular: Normal rate, regular rhythm, normal heart sounds and intact distal pulses  Exam reveals no gallop and no friction rub  No murmur heard  Pulmonary/Chest: Effort normal and breath sounds normal  No respiratory distress  Abdominal: Soft  He exhibits no distension  There is no tenderness  There is no rebound and no guarding  Neurological: He is alert  No cranial nerve deficit  GCS 14 (E4 V4 M6), -1 for confusion  Alert and oriented to person only, not to place or time   Skin: Skin is warm and dry   He is not diaphoretic          ______________________________________________________________________  Vitals:    10/24/17 0200 10/24/17 0231 10/24/17 0300 10/24/17 0400   BP: 138/73 117/68 111/63 104/58   Pulse: 86 80 64 56   Resp:    Temp:       TempSrc:       SpO2: 95% 95% 96% 96%   Weight:       Height:           Temperature:   Temp (24hrs), Av 1 °F (36 7 °C), Min:97 8 °F (36 6 °C), Max:98 4 °F (36 9 °C)    Current Temperature: 98 4 °F (36 9 °C)  Weights:   IBW: 70 7 kg    Body mass index is 22 17 kg/m²  Weight (last 2 days)     Date/Time   Weight    10/23/17 2130  68 1 (150 13)            Hemodynamic Monitoring:  N/A     Non-Invasive/Invasive Ventilation Settings:  Respiratory    Lab Data (Last 4 hours)    None         O2/Vent Data (Last 4 hours)    None              No results found for: PHART, AEA8EOF, PO2ART, WLP5LDZ, G4FCVJFQ, BEART, SOURCE  SpO2: SpO2: 96 %  Intake and Outputs:  I/O     None        Nutrition:        Diet Orders            Start     Ordered    10/23/17 2316  Diet NPO; Sips with meds  Diet effective now     Question Answer Comment   Diet Type NPO    NPO Except: Sips with meds    RD to adjust diet per protocol?  No        10/23/17 2315          Labs:     Results from last 7 days  Lab Units 10/23/17  1629   WBC Thousand/uL 5 83   HEMOGLOBIN g/dL 14 2   HEMATOCRIT % 43 0   PLATELETS Thousands/uL 200   NEUTROS PCT % 67   MONOS PCT % 7       Results from last 7 days  Lab Units 10/23/17  1629   SODIUM mmol/L 143   POTASSIUM mmol/L 4 0   CHLORIDE mmol/L 103   CO2 mmol/L 32   BUN mg/dL 16   CREATININE mg/dL 0 94   CALCIUM mg/dL 8 8   GLUCOSE RANDOM mg/dL 109         No results found for: PHOS     Results from last 7 days  Lab Units 10/23/17  2035   INR  1 00   PTT seconds 24       0  Lab Value Date/Time   TROPONINI <0 02 08/18/2017 1142         ABG:No results found for: PHART, BCW1CXO, PO2ART, FON0YLR, L9EWDCCL, BEART, SOURCE  Imaging: 10/23 CTH:IMPRESSION:     Compared to the 8/17/2017 head CT, there is a new 0 8 x 0 8 x 0 8 cm hyperdense focus predominantly in the left lateral ventricle but possibly arising from the corpus callosum (series 2 image 29 of series 400 image 59 )  There is also additional strands   acute hemorrhagic material in the left lateral ventriclealong the left choroid plexus (series 2 images 23 )      Because isolated intraventricular hemorrhage would be unusual in the setting of trauma, the round hyperdense foci could be a hemorrhagic lesion such as AVM  Recommend contrast enhanced MRI of the brain  I have personally reviewed pertinent reports  EKG: normal sinus  Micro:  No results found for: [place], SPUTUMCULTUR  Allergies: Allergies   Allergen Reactions    Codeine     Pollen Extract     Sulfa Antibiotics      Medications:   Scheduled Meds:    chlorhexidine 15 mL Swish & Spit Q12H [address]   memantine 10 mg Oral BID   pantoprazole 40 mg Oral Early Morning   polyethylene glycol 17 g Oral Daily   pravastatin 40 mg Oral Daily With Dinner   senna-docusate sodium 1 tablet Oral HS     Continuous Infusions:    multi-electrolyte 75 mL/hr Last Rate: 75 mL/hr (10/23/17 2000)   niCARdipine 1-15 mg/hr Last Rate: Stopped (10/24/17 0224)     PRN Meds:    metoprolol 5 mg Q6H PRN     VTE Pharmacologic Prophylaxis: Pharmacologic VTE Prophylaxis contraindicated due to IVH  VTE Mechanical Prophylaxis: sequential compression device  Invasive lines and devices: Invasive Devices     Peripheral Intravenous Line            Peripheral IV 10/23/17 Left Antecubital less than 1 day    Peripheral IV 10/23/17 Right Antecubital less than 1 day          Drain            External Urinary Catheter Small less than 1 day                     Portions of the record may have been created with voice recognition software  Occasional wrong word or "sound a like" substitutions may have occurred due to the inherent limitations of voice recognition software  Read the chart carefully and recognize, using context, where substitutions have occurred      Te Dent MD

## 2017-10-24 NOTE — SOCIAL WORK
Chart reviewed- Pt resides at [facility] in [facility]  CM spoke with RN - Hasmukh Norris to obtain baseline information  At baseline pt is oriented to self  Pt requires and assist of one for bathing and dressing  Pt uses a RW at baseline but needs verbal que's to remember to use it for ambulation  As per Hasmukh Norris pt gait is unsteady and he shuffles  Pt receives OP PT/OT through [facility] which comes to the facility he resides  As per Hasmukh Norris pt is on the secured dementia unit however he has his own room and does NOT have 24/7 supervision  As per Hasmukh Norris would need to contact Nursing Director - Holley Osgood [phone] when pt is medically cleared for d/c  As per Hasmukh Norris pt would need to be at baseline  CM cee Fletcher with PT  KEMAR left  for pt son - June Boncarbo to discuss care coordination and dcp  CM to follow

## 2017-10-24 NOTE — PLAN OF CARE
Problem: DISCHARGE PLANNING - CARE MANAGEMENT  Goal: Discharge to post-acute care or home with appropriate resources  INTERVENTIONS:  - Conduct assessment to determine patient/family and health care team treatment goals, and need for post-acute services based on payer coverage, community resources, and patient preferences, and barriers to discharge  - Address psychosocial, clinical, and financial barriers to discharge as identified in assessment in conjunction with the patient/family and health care team  - Arrange appropriate level of post-acute services according to patient's   needs and preference and payer coverage in collaboration with the physician and health care team  - Communicate with and update the patient/family, physician, and health care team regarding progress on the discharge plan  - Arrange appropriate transportation to post-acute venues  - Pt to d/c with appropriate resources when medically stable     Outcome: Progressing

## 2017-10-24 NOTE — PHYSICAL THERAPY NOTE
PHYSICAL THERAPY EVALUATION      10/24/17 1322   Note Type   Note type Eval only   Pain Assessment   Pain Assessment FLACC   Pain Rating: FLACC (Rest) - Face 0   Pain Rating: FLACC (Rest) - Legs 0   Pain Rating: FLACC (Rest) - Activity 0   Pain Rating: FLACC (Rest) - Cry 0   Pain Rating: FLACC (Rest) - Consolability 0   Score: FLACC (Rest) 0   Pain Rating: FLACC (Activity) - Face 0   Pain Rating: FLACC (Activity) - Legs 0   Pain Rating: FLACC (Activity) - Activity 0   Pain Rating: FLACC (Activity) - Cry 0   Pain Rating: FLACC (Activity) - Consolability 0   Score: FLACC (Activity) 0   Home Living   Type of Home Assisted living   Home Layout One level   Home Equipment Walker  (rw)   Additional Comments Pt is from assisted living  Per chart review pt uses rw    Prior Function   Level of [place] Needs assistance with ADLs and functional mobility; Needs assistance with IADLs   Lives With Facility staff   Receives Help From Personal care attendant   ADL Assistance Needs assistance   IADLs Needs assistance   Vocational Retired   Comments Pt lives in assisted living and requires assistance with ADLs and IADLs  requires cueing for safe mobility    Restrictions/Precautions   Weight Bearing Precautions Per Order No   Other Precautions Fall Risk;Multiple lines;Telemetry; Chair Alarm; Restraints  (posey )   General   Family/Caregiver Present No   Cognition   Overall Cognitive Status Impaired   RUE Assessment   RUE Assessment (grossly 3/5)   LUE Assessment   LUE Assessment (grossly 3/5)   RLE Assessment   RLE Assessment (grossly 3/5)   LLE Assessment   LLE Assessment (grossly 3/5)   Transfers   Sit to Stand 3  Moderate assistance   Additional items Assist x 1; Increased time required;Verbal cues   Stand to Sit 3  Moderate assistance   Additional items Assist x 1; Increased time required;Verbal cues   Ambulation/Elevation   Gait pattern Excessively slow; Short stride; Shuffling;Decreased foot clearance;Narrow GELACIO   Gait Assistance 3  Moderate assist   Additional items Assist x 2  (2nd person present for saftey )   Assistive Device Rolling walker   Distance 15 feet    Balance   Static Sitting Fair +   Static Standing Poor +   Ambulatory Poor   Endurance Deficit   Endurance Deficit Yes   Endurance Deficit Description decreased activity tolerance    Activity Tolerance   Activity Tolerance Treatment limited secondary to medical complications (Comment)   Nurse Made Aware Appropriate to treat per RN: Froy Barth    Assessment   Prognosis Fair   Problem List Decreased strength;Decreased endurance; Impaired balance;Decreased mobility; Decreased cognition; Impaired judgement;Decreased safety awareness   Assessment PT completed eval of an [de-identified]year old male who fell at assisted living  Pt sustained a hematoma to the L frontal region  Pt has a diagnosis of intraventricular left hemorrhage  Current medical and physical instabilities include multiple lines, telemetry, posey, chair alarm, falls risk, and a regression in functional status from baseline  PMHx is significant for dementia, prostate cancer, GERD, and HTN  PTA pt was living in assisted living and required assistance with ADLs, IADLs and verbal cueing for safe mobility  Per chart review pt ambulates with rw and has had multiple falls  During evaluation, pt required mod ax1 with sit to stand and stand to sit  Pt requires mod ax1 with ambulation (2nd person for safety with chair follow)  Pt ambulated 15 feet with rw  Pts gait demonstrated excessive shuffling and was excessively slow  Pt required constant cueing for safe ambulation  Evaluation ended with pt seated in reclining chair with chair alarm activated and posey attached  Current impairments included decreased strength, decreased balance, shuffled gait, and decreased activity tolerance  D/c recommendation to STR when medically appropriate   Pt will benefit from skilled INPT PT services this admission in order to achieve maximal function and safety  Barriers to Discharge Inaccessible home environment   Goals   Patient Goals none expressed    LTG Expiration Date 11/07/17   Long Term Goal #1 10-14 days pt will 1) perform bed mobility mod I 2) perform sit to stand and stand to sit mod I 3) ambulate 200 feet mod I with least restrictive device 4) improve b/l LE strength by 1/2 grade 5) improve balance by 1 grade 6) improve activity tolerance to 30 minutes    Treatment Day 0   Plan   Treatment/Interventions Functional transfer training;LE strengthening/ROM; Therapeutic exercise; Endurance training;Equipment eval/education; Bed mobility;Gait training;Spoke to nursing   PT Frequency 5x/wk   Recommendation   Recommendation Short-term skilled PT;Long-term skilled nursing home placement   Equipment Recommended Walker   PT - OK to Discharge Yes  (to STR )   Barthel Index   Feeding 5   Bathing 0   Grooming Score 0   Dressing Score 0   Bladder Score 10   Bowels Score 10   Toilet Use Score 5   Transfers (Bed/Chair) Score 5   Mobility (Level Surface) Score 0   Stairs Score 0   Barthel Index Score 35     Gavin Tucker

## 2017-10-24 NOTE — PROGRESS NOTES
Case discussed with trauma resident approximately 2320   51-year-old with baseline dementia  GCS 14 without focal neurological deficit  CT scan of the head personally reviewed  Small left intraventricular hemorrhage arising from small periventricular hyperdensity, likely hypertensive bleed  Not present on previous CT scan of the head dated August of 2017  Patient has received DDAVP  No surgical intervention anticipated  Patient is DNR/DNI  Monitor clinically and keep SBP less than 140 mm of mercury  All questions were answered  Trauma is in agreement with this course of action

## 2017-10-24 NOTE — CONSULTS
Consultation - Geriatrics   Thor Hensley [de-identified] y o  male MRN: [id number]  Unit/Bed#: ICU 03 Encounter: [id number]      Assessment/Plan  1  Delirium  Consider adding gabapentin 100mg QHS given night time behaviors, prolonged QTc  Recommend adding tylenol 650 MG q8 as patient unreliable historian and not likely to express he is in pain, which can precipitate delirium  Consider adding lidoderm patch, low dose oxycodone 2 8SG prn Q4 if applicable  Agree with bowel regimen  Redirect unwanted patient behaviors as first line tx  Reorient patient frequently  Avoid deliriogenic meds including tramadol, benzodiazepines, benadryl  Good sleep hygiene important, limit night time interruptions  Encourage patient to stay awake during the day  Ensure adequate hydration/nutrition  Mobilize often     2  Dementia   A*O x self at baseline  Will check B12/Folate/TSH for reversible causes of worsening  Consider taper of namenda as outpatient   Continue supportive care, see #1    3  Fall  Fall precautions  Suspicious home meds, Namenda  Consider taper of Namenda as outpatient  PT, OT    4  Ambulatory dysfunction  Ambulates with a rolled walker at baseline  PT, OT once appropriate  Patient may benefit from rehab to improve gait stability and strength  Consider adding vitamin D3 1000 international units daily to patient's medication regimen    5  Incontinence  Recommend frequent toileting every 2-3 hours to prevent incontinent episodes  Recommend changing chucks frequently to prevent skin breakdown    6  Deconditioning  Mobilize frequently to prevent further decline  PT OT  Rehab    7  Intraventricular hemorrhage  NSx consulted, NOM recommended, agree    8   Hypoalbuminemia  Per CC, speech eval prior to diet, agree  Recommend adding ensure bid when appropriate    History of Present Illness   Physician Requesting Consult: Kian Gray DO  Reason for Consult / Principal Problem: isar  Hx and PE limited by: n/a  HPI: Thor Hensley is a [de-identified]y o  year old male who presents to [facility] after unwitnessed fall  Patient has past medical history of dementia, incontinence  Patient was found to have intraventricular hemorrhage  He was admitted under the critical care team, Neurosurgery was consulted  No surgical intervention anticipated  Patient became impulsive and more confused over night, received Zyprexa, no improvement after 2 hours per note  Received 5 mg of Haldol  QTC was 454 prior to administration of Zyprexa, and  after Zyprexa  Per son, A*O x self at baseline  Prior to arrival patient lived in a nursing home  He had assistance with ADLs and IADLs  He used a walker for ambulation  Upon exam patient is sedated  He received 2 antipsychotics overnight  Nurse at bedside reports that he has been sedated all morning          Inpatient consult to Gerontology  Consult performed by: Kang Morgan  Consult ordered by: Laura Monique          Review of Systems   Unable to perform ROS: Dementia       Historical Information   Past Medical History:   Diagnosis Date    Cancer [facility])     prostate    Dementia     Ehrlichiosis     GERD (gastroesophageal reflux disease)     Hematuria     Hypertension     Lyme disease     Neoplasm of prostate      Past Surgical History:   Procedure Laterality Date    BLADDER SUSPENSION      HIP ARTHROPLASTY      SHOULDER SURGERY Right      Social History   History   Alcohol Use No     History   Drug Use No     History   Smoking Status    Never Smoker   Smokeless Tobacco    Never Used         Family History: non-contributory    Meds/Allergies   Current meds:   Current Facility-Administered Medications   Medication Dose Route Frequency    chlorhexidine (PERIDEX) 0 12 % oral rinse 15 mL  15 mL Swish & Spit Q12H [address]    memantine (NAMENDA) tablet 10 mg  10 mg Oral BID    metoprolol (LOPRESSOR) injection 5 mg  5 mg Intravenous Q6H PRN    multi-electrolyte (ISOLYTE-S PH 7 4 equivalent) IV solution  75 mL/hr Intravenous Continuous    niCARdipine (CARDENE) 25 mg (STANDARD CONCENTRATION) in sodium chloride 0 9% 250 mL  1-15 mg/hr Intravenous Titrated    pantoprazole (PROTONIX) EC tablet 40 mg  40 mg Oral Early Morning    polyethylene glycol (MIRALAX) packet 17 g  17 g Oral Daily    potassium chloride 40 mEq IVPB (premix)  40 mEq Intravenous Once    potassium chloride 40 mEq IVPB (premix)  40 mEq Intravenous Once    pravastatin (PRAVACHOL) tablet 40 mg  40 mg Oral Daily With Dinner    senna-docusate sodium (SENOKOT S) 8 6-50 mg per tablet 1 tablet  1 tablet Oral HS      Current PTA meds:  Prescriptions Prior to Admission   Medication    aspirin 81 mg chewable tablet    esomeprazole (NexIUM) 40 MG capsule    memantine (NAMENDA) 10 mg tablet    pravastatin (PRAVACHOL) 40 mg tablet        Allergies   Allergen Reactions    Codeine     Pollen Extract     Sulfa Antibiotics        Objective   Vitals: Blood pressure 110/58, pulse 58, temperature 97 7 °F (36 5 °C), temperature source Oral, resp  rate 14, height 5' 9" (1 753 m), weight 68 1 kg (150 lb 2 1 oz), SpO2 97 %  ,Body mass index is 22 17 kg/m²  Physical Exam   Constitutional: He appears well-developed  No distress  HENT:   Head: Normocephalic and atraumatic  Mouth/Throat: No oropharyngeal exudate  Eyes: Conjunctivae and EOM are normal  No scleral icterus  Neck: Neck supple  No thyromegaly present  Cardiovascular: Normal rate  Pulmonary/Chest: Effort normal and breath sounds normal  He has no wheezes  He has no rales  Abdominal: Soft  Bowel sounds are normal  He exhibits no distension  Musculoskeletal: Normal range of motion  He exhibits no edema  Neurological: He is alert  Skin: Skin is warm and dry  Psychiatric: He is not agitated and not actively hallucinating  Cognition and memory are impaired  He expresses inappropriate judgment  He exhibits abnormal recent memory and abnormal remote memory     A*O x 1 at baseline  Delirium overnight  Sedated this AM, received multiple doses of antipsychotic overnight  Nursing note and vitals reviewed  Lab Results:   Results from last 7 days  Lab Units 10/24/17  0541   WBC Thousand/uL 11 30*   HEMOGLOBIN g/dL 14 5   HEMATOCRIT % 42 2   PLATELETS Thousands/uL 199        Results from last 7 days  Lab Units 10/24/17  0541   SODIUM mmol/L 141   POTASSIUM mmol/L 3 3*   CHLORIDE mmol/L 103   CO2 mmol/L 30   BUN mg/dL 10   CREATININE mg/dL 0 74   CALCIUM mg/dL 8 7   GLUCOSE RANDOM mg/dL 105       Imaging Studies: I have personally reviewed pertinent reports  EKG, Pathology, and Other Studies: I have personally reviewed pertinent reports  VTE Prophylaxis: Sequential compression device Jose Singer     Code Status: Level 3 - DNAR and DNI      Counseling/Coordination of Care: Total floor / unit time spent today 30 minutes  Greater than 50% of total time was spent with the patient and / or family counseling and / or coordination of care   A description of the counseling / coordination of care: Assessing examine the patient, reviewing EMR meds, speaking to nurse at bedside, speaking to Critical Care Team

## 2017-10-24 NOTE — PROGRESS NOTES
Patient passed bedside swallow evaluation but began coughing when pudding was given  Patient made NPO again and formal speech evaluation was ordered

## 2017-10-24 NOTE — PROGRESS NOTES
Patient confused - trying to get out of bed and remove various IV lines  Still with no focal neurological deficits  Patient unable to be reoriented by nursing   on my calculation from printed rhythm strip, 5mg zyprexa given  After 2hrs, no improvement in patient's acute delirium  Patient still a safety risk to himself  12 lead ordered and   We will give 5mg haldol and reassess

## 2017-10-24 NOTE — SOCIAL WORK
KEMAR spoke with Abril with PT - recommendation is STR at this time  CM informed pt son - Juan Jose Pathak who is at bedside  At this time he is unsure if he would prefer pt to go to STR vs back to his assisted living  CM informed Juan Jose Pathak of some concerns that pt is not at baseline from when he was admitted to the hospital  Juan Jose Pathak also aware nursing director would need to review pt when he is ready at d/c to determine if he could return or need STR prior  Juan Jose Pathak reports prior to admission at [facility] pt was in two STR in [employer] and feels he did not benefit from the rehab  He reports he feels he could get better rehab at [facility]  CM to follow

## 2017-10-24 NOTE — PROGRESS NOTES
10/24/17 Mukund Stauffer 15   Spiritual Beliefs/Perceptions   Support Systems Children;Family members   Stress Factors   Patient Stress Factors None identified   Family Stress Factors Other (Comment)   Coping Responses   Patient Coping Other (Comment)   Family Coping Open/discussion   Plan of Care   Comments Son was presence visiting father  Provided pastoral presence , and listening support     Assessment Completed by: Unit visit

## 2017-10-25 ENCOUNTER — APPOINTMENT (INPATIENT)
Dept: RADIOLOGY | Facility: HOSPITAL | Age: 82
DRG: 086 | End: 2017-10-25
Payer: MEDICARE

## 2017-10-25 LAB
ANION GAP SERPL CALCULATED.3IONS-SCNC: 7 MMOL/L (ref 4–13)
BUN SERPL-MCNC: 12 MG/DL (ref 5–25)
CALCIUM SERPL-MCNC: 8.8 MG/DL (ref 8.3–10.1)
CHLORIDE SERPL-SCNC: 104 MMOL/L (ref 100–108)
CO2 SERPL-SCNC: 29 MMOL/L (ref 21–32)
CREAT SERPL-MCNC: 0.78 MG/DL (ref 0.6–1.3)
FOLATE SERPL-MCNC: >20 NG/ML (ref 3.1–17.5)
GFR SERPL CREATININE-BSD FRML MDRD: 85 ML/MIN/1.73SQ M
GLUCOSE SERPL-MCNC: 70 MG/DL (ref 65–140)
HCT VFR BLD AUTO: 41.6 % (ref 36.5–49.3)
HGB BLD-MCNC: 14.4 G/DL (ref 12–17)
MRSA NOSE QL CULT: NORMAL
POTASSIUM SERPL-SCNC: 3.9 MMOL/L (ref 3.5–5.3)
SODIUM SERPL-SCNC: 140 MMOL/L (ref 136–145)
TSH SERPL DL<=0.05 MIU/L-ACNC: 2.69 UIU/ML (ref 0.36–3.74)
VIT B12 SERPL-MCNC: 336 PG/ML (ref 100–900)

## 2017-10-25 PROCEDURE — G8988 SELF CARE GOAL STATUS: HCPCS

## 2017-10-25 PROCEDURE — 84443 ASSAY THYROID STIM HORMONE: CPT | Performed by: PHYSICIAN ASSISTANT

## 2017-10-25 PROCEDURE — 80048 BASIC METABOLIC PNL TOTAL CA: CPT | Performed by: PHYSICIAN ASSISTANT

## 2017-10-25 PROCEDURE — 70450 CT HEAD/BRAIN W/O DYE: CPT

## 2017-10-25 PROCEDURE — 85018 HEMOGLOBIN: CPT | Performed by: SURGERY

## 2017-10-25 PROCEDURE — 85014 HEMATOCRIT: CPT | Performed by: SURGERY

## 2017-10-25 PROCEDURE — 82746 ASSAY OF FOLIC ACID SERUM: CPT | Performed by: PHYSICIAN ASSISTANT

## 2017-10-25 PROCEDURE — 97167 OT EVAL HIGH COMPLEX 60 MIN: CPT

## 2017-10-25 PROCEDURE — 82607 VITAMIN B-12: CPT | Performed by: PHYSICIAN ASSISTANT

## 2017-10-25 PROCEDURE — 97530 THERAPEUTIC ACTIVITIES: CPT

## 2017-10-25 PROCEDURE — 92610 EVALUATE SWALLOWING FUNCTION: CPT

## 2017-10-25 PROCEDURE — G8987 SELF CARE CURRENT STATUS: HCPCS

## 2017-10-25 RX ORDER — OLANZAPINE 10 MG/1
5 INJECTION, POWDER, LYOPHILIZED, FOR SOLUTION INTRAMUSCULAR ONCE
Status: COMPLETED | OUTPATIENT
Start: 2017-10-25 | End: 2017-10-25

## 2017-10-25 RX ORDER — GABAPENTIN 100 MG/1
100 CAPSULE ORAL
Status: DISCONTINUED | OUTPATIENT
Start: 2017-10-25 | End: 2017-10-27 | Stop reason: HOSPADM

## 2017-10-25 RX ADMIN — METOPROLOL TARTRATE 5 MG: 5 INJECTION, SOLUTION INTRAVENOUS at 20:26

## 2017-10-25 RX ADMIN — ACETAMINOPHEN 650 MG: 325 TABLET, FILM COATED ORAL at 22:26

## 2017-10-25 RX ADMIN — Medication 1 TABLET: at 22:26

## 2017-10-25 RX ADMIN — PRAVASTATIN SODIUM 40 MG: 40 TABLET ORAL at 18:58

## 2017-10-25 RX ADMIN — GABAPENTIN 100 MG: 100 CAPSULE ORAL at 22:26

## 2017-10-25 RX ADMIN — SODIUM CHLORIDE, SODIUM GLUCONATE, SODIUM ACETATE, POTASSIUM CHLORIDE, MAGNESIUM CHLORIDE, SODIUM PHOSPHATE, DIBASIC, AND POTASSIUM PHOSPHATE 75 ML/HR: .53; .5; .37; .037; .03; .012; .00082 INJECTION, SOLUTION INTRAVENOUS at 11:22

## 2017-10-25 RX ADMIN — OLANZAPINE 5 MG: 10 INJECTION, POWDER, FOR SOLUTION INTRAMUSCULAR at 00:23

## 2017-10-25 RX ADMIN — MEMANTINE HYDROCHLORIDE 10 MG: 10 TABLET ORAL at 18:57

## 2017-10-25 NOTE — SPEECH THERAPY NOTE
Speech Language/Pathology  Speech/Language Pathology  Assessment    Patient Name: Logan Hollingsworth  OYWPR'U Date: 10/25/2017     Problem List  Patient Active Problem List   Diagnosis    [facility]    Dementia    HTN (hypertension)    Fall from standing    Scalp hematoma    On aspirin at home    HLD (hyperlipidemia)    Protein calorie malnutrition ([facility]    Ambulatory dysfunction    Delirium    Incontinence    Physical deconditioning     Past Medical History  Past Medical History:   Diagnosis Date    Cancer ([facility] )     prostate    Dementia     Ehrlichiosis     GERD (gastroesophageal reflux disease)     Hematuria     Hypertension     Lyme disease     Neoplasm of prostate      Past Surgical History  Past Surgical History:   Procedure Laterality Date    BLADDER SUSPENSION      HIP ARTHROPLASTY      SHOULDER SURGERY Right        Per neurosx: Attestation signed by Doretha Youssef MD at 10/24/2017 5:12 PM   I performed history and exam of patient      This 83y/o demented male from long stay nursing home sustained an unwitnessed fall at a NH found to have a subependymal left medial lateral ventricle hyperdensity on CT head c/w hemorrhage       This looks like a traumatic subependymal hemorrhage that is held up by some ependyma in the medial roof of the left lateral ventricle  Some blood is gait and is layering on the floor of the ventricle      There is age-related cerebral atrophy with wide sylvian fissures and generous wide sulci     · Exam reveals elderly man with poor memory  He is unable to give me the day month or year  Unsure of location  Hesitant replies  · Confabulates when he does not know the answer  · Alert, sitting in a chair  Fumbles with newspaper  · Cooperates with some mimicked commands  ·  GCS  13 - 14; E4, V3, M6      · FC x4  Some monosyllabic words and phrases but inappropriate tangential answers    · Moves all extremities well  ·  Repeat exam in afternoon GCS14    · Images reviewed p by me Final results as below  ? CT head wo 10/23: age related cerebral atrophy atrophy  Sub centimeter marble size area of hyperdensity   hyperdensity in medial subependymal location roof of left lateral ventricle c/w hemorrhage  There is some break through the ependyma lysed lining with some blood layering on the floor of the ventricle  ? CT cervical spine wo 10/23: No acute fracture or malignant noted  · Son was able to bring in report from prior MRI brain completed at [facility] on 5/23/17 for confusion which will be scanned in  ·  Findings included cortical and central stable volume loss compared to 10/29/2013     · No evidence of ICH  No intra or extra-axial fluid collection  ·  No evidence of mass  · Stable mild chronic small vessel ischemic disease  · This no indication for neurosurgical intervention  · Small area of hemorrhage should involute and  shrink down over a month   · Recommend CT head wo prior to discharge  Ordered for 10/25  · Continue to hold ASA  ·  If no strong indication, consider indefinite discontinuation as patient is fall risk  · No neurosurgical intervention indicated or anticipated  · No outpatient follow-up anticipated  · Will continue to follow and review CT head once completed       I discussed management with the Mert Soto and the trauma team        I discussed with Sybil Lawler  I agree with her documented findings and plan of care  We will continue to follow and advise      Please call for new questions or concerns      5:03 PM 10/24/2017     Nadege Velásquez MD, Attending Neurological Surgeon         Expand All Collapse All    []Hide copied text  Consultation - Neurosurgery   Mounika Del Valle [de-identified] y o  male MRN: [id number]  Unit/Bed#: ICU 03 Encounter: [id number]        Patient was seen and examined along with images reviewed on 10/24/17 at 1130am       Inpatient consult to Neurosurgery  Consult performed by: Ca Sanderson ordered by: Karlos Raygoza              Assessment/Plan         Assessment:  1  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 CT head wo 10/23: age appropriate atrophy  Subcentimeter left lateral ventricular hyperdensity c/w hemorrhage  ? CT cervical spine wo 10/23: No acute fracture or malignant noted  · Son was able to bring in report from prior MRI brain completed at [facility] on 5/23/17 for confusion which will be scanned in  Findings included cortical and central stable volume loss compared to 10/29/2013  No evidence of ICH  No intra or extra-axial fluid collection  No evidence of mass  Stable mild chronic small vessel ischemic disease  · Recommend CT head wo prior to discharge  Ordered for 10/25  · Continue to hold ASA  If no strong indication, consider indefinite discontinuation as patient is fall risk  · No neurosurgical intervention indicated or anticipated  No outpatient follow-up anticipated  · Will continue to follow and review CT head once completed          History of Present Illness        HPI: Addison Swanson is a [de-identified] y o  male with PMH including  who presents following a fall at [facility]  Patient does not offer any history  History obtained from chart  Patient was found down with left frontal hematoma and taken to [facility]  CT head completed demonstrating a left lateral ventricle hyperdensity c/w hemorrhage   For these reason, he was transferred to [facility] for ongoing medical management and neurosurgical evaluation  Currently, patient offers no complaints and offers limited verbal interaction  Reason for consult:  R/o aspiration  Determine safest and least restrictive diet  Change in mental status  New neuro event  H/o neurological disease  Current diet:  npo  Premorbid diet[de-identified]  regular  Previous VBS:  none  O2 requirement:  none  Voice/Speech:  Minimal mouth opening/mumbling  Son reported his speech wasn't very clear to begin with  Social:  Jonah Del Valle  Follows commands:  basic                     Cognitive Status:  Confused  Does not initiate speech but will respond  Son states he is more confused out of his usual environment  [facility]  Mouth dry  Tongue at midline       Lip retraction symmetrical  unabel to swallow or cough upon request  Noted audible pooling of saliva in pharynx and/or larynx prio to PO    Items administered:  Puree x 1, ice chip x 1 (pt was then taken for CT)    Oral stage: Wf/ w/ min trials  Pharyngeal stage:  Coughed on trial of each  Swallow promptness:mld/mod delay  Laryngeal rise:weak  Wet voice:no  Throat clear:no  Cough:yes  Secondary swallows:no  Audible swallows:min    Esophageal stage:  H/o GERD    Summary:  Pt presents w/ s/s aspiration on ice chips and also 1/2 tsp puree  Noted saliva (audible) pooling in pharynx prior to PO trials  Not appropriate for additional po trials, and pt was then taken to CT  Recommendations:  Diet: npo for now  Meds:iv  Aspiration precautions  Reflux precautions    Results d/w:  Pt, family, nurse    Consider consult w/:  Nutrition    Goal(s):  ongoing swallow eval for now

## 2017-10-25 NOTE — PROGRESS NOTES
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 CT head wo 10/25: increased bilateral occipital horn IVH without hydrocephalus  ? CT head wo 10/23: age appropriate atrophy  Subcentimeter left lateral ventricular hyperdensity c/w hemorrhage  ? CT cervical spine wo 10/23: No acute fracture or malignant noted  · Son was able to bring in report from prior MRI brain completed at [facility] on 5/23/17 for confusion which will be scanned in  Findings included cortical and central stable volume loss compared to 10/29/2013  No evidence of ICH  No intra or extra-axial fluid collection  No evidence of mass  Stable mild chronic small vessel ischemic disease  · Recommend CT head wo prior to discharge  Ordered for 10/26  · Continue to hold ASA  If no strong indication, consider indefinite discontinuation as patient is fall risk  · No neurosurgical intervention indicated or anticipated  No outpatient follow-up anticipated  · Will continue to follow and review CT head once completed  · Call with questions/concerns    Subjective/Objective   Chief Complaint: "I need to go to the bathroom"/follow-up IVH    Subjective: Patient denies headache, vision or speech difficulties  Denies weakness  Denies neck or back pain  Denies CP/SOB/N/V   Son offers concerns that patient is reaching for the ceiling to grab objects that are that there  Objective: Laying in bed  Restless  + posey  Attempting to get out of bed  I/O       10/23 0701 - 10/24 0700 10/24 0701 - 10/25 0700 10/25 0701 - 10/26 0700    P  O   0 0    I V  (mL/kg) 965 8 (14 2) 1050 (15 4)     IV Piggyback 100 400     Total Intake(mL/kg) 1065 8 (15 7) 1450 (21 3) 0 (0)    Urine (mL/kg/hr) 100 2100 (1 3)     Stool 0 0 (0)     Total Output 100 2100      Net +965 8 -650 0           Unmeasured Urine Occurrence 7 x 3 x     Unmeasured Stool Occurrence 6 x 3 x           Invasive Devices     Peripheral Intravenous Line            Peripheral IV 10/23/17 Left Antecubital 1 day                Physical Exam:  Vitals: Blood pressure 157/75, pulse 65, temperature 97 5 °F (36 4 °C), temperature source Oral, resp  rate 18, height 5' 9" (1 753 m), weight 68 1 kg (150 lb 2 1 oz), SpO2 94 %  ,Body mass index is 22 17 kg/m²      Temp:  [97 5 °F (36 4 °C)-98 5 °F (36 9 °C)] 97 5 °F (36 4 °C)  HR:  [56-80] 65  Resp:  [14-18] 18  BP: (145-170)/(63-88) 145/70    General appearance: alert, appears stated age, cooperative and no distress  Head: left frontal abrasion  Eyes: EOMI, PERRL  Neck: supple, symmetrical, trachea midline and NT  Back: no kyphosis present, no tenderness to percussion or palpation  Lungs: non labored breathing  Heart: regular heart rate  Neurologic:   Mental status: Alert, oriented x self,  Cranial nerves: grossly intact (Cranial nerves II-XII)  Sensory: normal to LT  Motor: moving all extremities without focal weakness   Coordination: finger to nose normal bilaterally, no drift bilaterally    Lab Results:    Results from last 7 days  Lab Units 10/24/17  0541 10/23/17  1629   WBC Thousand/uL 11 30* 5 83   HEMOGLOBIN g/dL 14 5 14 2   HEMATOCRIT % 42 2 43 0   PLATELETS Thousands/uL 199 200   NEUTROS PCT % 79* 67   MONOS PCT % 9 7       Results from last 7 days  Lab Units 10/25/17  0439 10/24/17  0541 10/23/17  1629   SODIUM mmol/L 140 141 143   POTASSIUM mmol/L 3 9 3  3* 4 0   CHLORIDE mmol/L 104 103 103   CO2 mmol/L 29 30 32   BUN mg/dL 12 10 16   CREATININE mg/dL 0 78 0 74 0 94   CALCIUM mg/dL 8 8 8 7 8 8   GLUCOSE RANDOM mg/dL 70 105 109               Results from last 7 days  Lab Units 10/23/17  2035   INR  1 00   PTT seconds 24     No results found for: TROPONINT  ABG:No results found for: PHART, BOZ7WND, PO2ART, CDJ2VXB, G7BBUKOC, BEART, SOURCE    Imaging Studies: I have personally reviewed pertinent reports  and I have personally reviewed pertinent films in PACS    CT head wo 10/25: Increased blood products within the occipital horns which may reflect an element of progressive bleeding when compared to the prior versus redistribution  No evidence for obstructive hydrocephalus  EKG, Pathology, and Other Studies: I have personally reviewed pertinent reports        VTE Pharmacologic Prophylaxis: Reason for no pharmacologic prophylaxis IVH    VTE Mechanical Prophylaxis: sequential compression device

## 2017-10-25 NOTE — PHYSICAL THERAPY NOTE
PT TREATMENT        10/25/17 1358   Pain Assessment   Pain Assessment FLACC   Pain Rating: FLACC (Rest) - Face 0   Pain Rating: FLACC (Rest) - Legs 0   Pain Rating: FLACC (Rest) - Activity 0   Pain Rating: FLACC (Rest) - Cry 0   Pain Rating: FLACC (Rest) - Consolability 0   Score: FLACC (Rest) 0   Pain Rating: FLACC (Activity) - Face 0   Pain Rating: FLACC (Activity) - Legs 0   Pain Rating: FLACC (Activity) - Activity 0   Pain Rating: FLACC (Activity) - Cry 1   Pain Rating: FLACC (Activity) - Consolability 0   Score: FLACC (Activity) 1   Restrictions/Precautions   Weight Bearing Precautions Per Order No   Other Precautions Cognitive; Chair Alarm; Bed Alarm; Restraints; Fall Risk;Pain   General   Chart Reviewed Yes   Response to Previous Treatment Patient unable to report, no changes reported from family or staff   Family/Caregiver Present Yes   Cognition   Overall Cognitive Status Impaired   Arousal/Participation Arousable   Attention Attends with cues to redirect   Orientation Level Oriented to person;Disoriented to place; Disoriented to time;Disoriented to situation   Memory Decreased recall of precautions;Decreased recall of recent events;Decreased short term memory;Decreased recall of biographical information   Following Commands Follows one step commands with increased time or repetition   Comments Pt with baseline dementia    Subjective   Subjective Pt eager to get OOB    Bed Mobility   Supine to Sit 3  Moderate assistance   Additional items Assist x 1; Increased time required;Verbal cues   Additional Comments Pt with impaired sitting balance sitting EOB in which he required CGA->dependent assistance    Transfers   Sit to Stand 3  Moderate assistance   Additional items Assist x 1; Increased time required;Verbal cues   Stand to Sit 3  Moderate assistance   Additional items Assist x 1; Increased time required;Verbal cues   Ambulation/Elevation   Gait pattern Shuffling; Festenating  (inconsistent step length )   Gait Assistance 3  Moderate assist   Additional items Assist x 2   Assistive Device Rolling walker   Distance 5'   Balance   Static Sitting Poor   Dynamic Sitting Poor -   Static Standing Poor +   Dynamic Standing Poor   Endurance Deficit   Endurance Deficit Yes   Endurance Deficit Description fatigue; poor tolerance to activity    Activity Tolerance   Activity Tolerance Treatment limited secondary to medical complications (Comment); Patient limited by fatigue   Nurse Made Aware Makayla   Assessment   Prognosis Fair   Problem List Decreased strength;Decreased endurance; Impaired balance;Decreased mobility; Decreased coordination;Decreased cognition;Decreased safety awareness; Impaired judgement   Assessment Pt engaged in PT treatment session focussing on improving overall functional mobililty  Son present during session in which he reports patient with baseline dementia in which he is oriented only to self  Son reports ambualtion greatly less than baseline at this time  Pt requires moderate Ax2 during amulatory tasks  During gait, pt with inconsistent step length  Pt able to follow 1 step command to increase step length from shuffled gait pattern but unable to accurately gauge for safety with this task  PT to conitnue to follow pt during stay to progress functioanl mobililty (I) and safety  Rehab with ? long term SNF placement recommended at this time  Goals   Patient Goals none expressed   LTG Expiration Date 11/07/17   Treatment Day 1   Plan   Treatment/Interventions Functional transfer training;LE strengthening/ROM; Therapeutic exercise; Endurance training;Patient/family training;Cognitive reorientation;Equipment eval/education; Bed mobility;Gait training   Progress Progressing toward goals   PT Frequency 2-3x/wk   Recommendation   Recommendation Short-term skilled PT;Long-term skilled nursing home placement   Equipment Recommended Walker; Wheelchair   PT - OK to Discharge Yes  (to STR when medically appropriate ) Additional Comments OOB in chair with alarm and restriant applied   Son at bedside       Pratibha Whitman, PT

## 2017-10-25 NOTE — PLAN OF CARE
Problem: OCCUPATIONAL THERAPY ADULT  Goal: Performs self-care activities at highest level of function for planned discharge setting  See evaluation for individualized goals  Treatment Interventions: ADL retraining, Functional transfer training, Endurance training, Cognitive reorientation, Patient/family training, Equipment evaluation/education, Activityengagement, Energy conservation          See flowsheet documentation for full assessment, interventions and recommendations  Limitation: Decreased ADL status, Decreased Safe judgement during ADL, Decreased cognition, Decreased endurance, Decreased self-care trans, Decreased high-level ADLs  Prognosis: Fair  Assessment: 83 YO MALE SEEN FOR INITIAL OT EVAL S/P FALL WITH HEMATOMA ON L FRONTAL REGION  DX INCLUDES L INTRAVENTRICULAR HEMORRHAGE  PT WITH SIGNIFICANT ROBLEMS LIST INCLUDING BASELINE DEMENTIA, PROSTATE CA, AND HTN  PT IS A POOR HISTORIAN 2' BASELINE DEMENTIA, INFORMATION OBTAINED FROM PT'S SON WHO WAS PRESENT FOR OT SESSION  PT FROM [facility] WHERE HE REQUIRED ASSISTANCE FOR ADLS/IADLS AT BASELINE  SON REPORTS HE USES RW, HOWEVER, "FORGETS" TO USE IT AND HAS HAD MULTIPLE FALLS  PT'S SON REPORTS PT DOES NOT HAVE 24 HOUR SUPERVISION AT FACILITY (?)  PT CURRENTLY REQUIRES OVERALL MIN A FOR UB ADLS, MAX A FOR LB ADLS, MOD A FOR TRANSFERS AND MOD A X2 FOR LIMITED FUNCTIONAL MOBILITY WITH USE OF RW  PT IS LIMITED 2' FATIGUE, IMPAIRED BALANCE, OVERALL WEAKNESS, BASELINE DEMENTIA WITH DELIRIUM, LIMITED INSIGHT/JUDGEMENT/SAFETY AWARENESS, REQUIRING RESTRAINTS AND LIMITED ACTIVITY TOLERANCE  PT'S SON REPORTS PT IS FUNCTIONING BELOW BASELINE  FROM AN OT PERSPECTIVE, PT WOULD BENEFIT FROM CONT OT SERVICES IN AN INPT REHAB SETTING WITH POSSIBLE LT PLACEMENT WITH ADDITIONAL ASSISTANCE REQUIRED  WILL CONT TO FOLLOW  OT Discharge Recommendation: Short Term Rehab (+ POSSIBLE LT PLACEMENT WITH ADDITIONAL ASSIST/SUPERVISION)  OT - OK to Discharge: Yes (WHEN MEDICALLY CLEARED  )

## 2017-10-25 NOTE — PROGRESS NOTES
Progress Note - Victorina Dela Cruz [de-identified] y o  male MRN: [id number]    [facility] Encounter: [id number]      Assessment/Plan  1  Delirium  Patient received Zyprexa over night  Continues to be confused, hallucinating  Consider adding gabapentin 100mg QHS given night time behaviors, prolonged QTc  Tylenol 650 mg Q 8 added, agree  Consider adding lidoderm patch, low dose oxycodone 2 2RJ prn Q4 if applicable  Agree with bowel regimen, continue with MiraLax, patient has had multiple BMs  Redirect unwanted patient behaviors as first line tx  Reorient patient frequently  Avoid deliriogenic meds including tramadol, benzodiazepines, benadryl  Good sleep hygiene important, limit night time interruptions  Encourage patient to stay awake during the day  Ensure adequate hydration/nutrition  Mobilize often      2  Dementia   A*O x self at baseline  Continues to be confused, hallucinating, received Zyprexa overnight  B12, folate, TSH within normal limits  Consider taper of namenda as outpatient   Continue supportive care, see #1     3  Fall  Fall precautions  Suspicious home meds, Namenda  Consider taper of Namenda as outpatient  PT, OT recommending short-term skilled PT, long-term skilled nursing home placement, agree     4  Ambulatory dysfunction  Ambulates with a rolled walker at baseline  PT, OT recommending short-term skilled PT, long-term skilled nursing home placement, agree  Patient may benefit from rehab to improve gait stability and strength  Consider adding vitamin D3 1000 international units daily to patient's medication regimen     5  Incontinence  Recommend frequent toileting every 2-3 hours to prevent incontinent episodes  Recommend changing chucks frequently to prevent skin breakdown     6  Deconditioning  Mobilize frequently to prevent further decline  PT OT recommending a rehab, skilled nursing facility long-term  Rehab     7   Intraventricular hemorrhage  NSx consulted, NOM recommended, agree  Increase in blood and ventricles today     8  Hypoalbuminemia  Per CC, speech eval prior to diet, agree  Recommend adding ensure bid when appropriate   Continues to be NPO    Subjective:   Patient became agitated over night  Received 1 time dose of Zyprexa  This a m  patient continues to hallucinate, be more confused than usual   Pertinent ROS systems cannot be gathered secondary to mental status  Objective:     Vitals: Blood pressure 157/75, pulse 65, temperature 97 5 °F (36 4 °C), temperature source Oral, resp  rate 18, height 5' 9" (1 753 m), weight 68 1 kg (150 lb 2 1 oz), SpO2 94 %  ,Body mass index is 22 17 kg/m²  Intake/Output Summary (Last 24 hours) at 10/25/17 0951  Last data filed at 10/25/17 0640   Gross per 24 hour   Intake             1450 ml   Output             2000 ml   Net             -550 ml       Physical Exam: General : WD in NAD  HEENT : MMM no erythema or exudates  EOMI, sclera anicteric  Heart : Normal rate  Lungs : CTA  Abdomen : Soft, NT/ND, BS auscultated in all 4 quads  No organomegaly  Ext :  Pulses +2/4 B/L  Neg edema B/L  Neg calf swelling B/L  Skin : Pink, warm, dry, age appropriate turgor and mobility  Neuro : Nonfocal  Psych : A * O x self, hallucinating, agitated overnight        Invasive Devices     Peripheral Intravenous Line            Peripheral IV 10/23/17 Left Antecubital 1 day                Lab, Imaging and other studies: I have personally reviewed pertinent reports      VTE Pharmacologic Prophylaxis: Sequential compression device (Venodyne)   VTE Mechanical Prophylaxis: sequential compression device

## 2017-10-25 NOTE — PROGRESS NOTES
Pt started to become increasingly aggitated, and continued to try and get out of bed  Paged trauma spoke with Mary Jo to see if we could give him something because he already had on a waist belt  Orders were put in for 1 time dose of zyprexa IM  Will continue to monitor

## 2017-10-25 NOTE — PLAN OF CARE
Problem: PHYSICAL THERAPY ADULT  Goal: Performs mobility at highest level of function for planned discharge setting  See evaluation for individualized goals  Treatment/Interventions: Functional transfer training, LE strengthening/ROM, Therapeutic exercise, Endurance training, Equipment eval/education, Bed mobility, Gait training, Spoke to nursing  Equipment Recommended: Gianni Antunez       See flowsheet documentation for full assessment, interventions and recommendations  Noé Ponce    Outcome: Progressing  Prognosis: Fair  Problem List: Decreased strength, Decreased endurance, Impaired balance, Decreased mobility, Decreased coordination, Decreased cognition, Decreased safety awareness, Impaired judgement  Assessment: Pt engaged in PT treatment session focussing on improving overall functional mobililty  Son present during session in which he reports patient with baseline dementia in which he is oriented only to self  Son reports ambualtion greatly less than baseline at this time  Pt requires moderate Ax2 during amulatory tasks  During gait, pt with inconsistent step length  Pt able to follow 1 step command to increase step length from shuffled gait pattern but unable to accurately gauge for safety with this task  PT to conitnue to follow pt during stay to progress functioanl mobililty (I) and safety  Rehab with ? long term SNF placement recommended at this time  Barriers to Discharge: Inaccessible home environment     Recommendation: Short-term skilled PT, Long-term skilled nursing home placement     PT - OK to Discharge: Yes (to STR when medically appropriate )    See flowsheet documentation for full assessment

## 2017-10-25 NOTE — PROGRESS NOTES
Progress Note - Trauma   Ankur Browning [de-identified] y o  male MRN: [id number]  [facility] Encounter: [id number]        Assessment/Plan: S/P Fall  1  IVH        -  Neuro checks        -  Neurosurgery following    2  Dementia        -  Geriatrics following    3  Scalp hematoma        -  Soft    4  PTOt    5  Pain management         -  Po meds     6  DVT prophylaxis        -  SCD's    7  Disposition         - case management    Chief Complaint: none    Subjective: I am in [place]    Meds/Allergies   Prescriptions Prior to Admission   Medication Sig Dispense Refill Last Dose    aspirin 81 mg chewable tablet Chew 81 mg daily   10/23/2017 at Unknown time    esomeprazole (NexIUM) 40 MG capsule Take 40 mg by mouth every morning before breakfast   10/23/2017 at Unknown time    lisinopril-hydrochlorothiazide (PRINZIDE,ZESTORETIC) 20-12 5 MG per tablet Take 1 tablet by mouth daily       memantine (NAMENDA) 10 mg tablet Take 10 mg by mouth 2 (two) times a day   10/22/2017 at Unknown time    pravastatin (PRAVACHOL) 40 mg tablet Take 40 mg by mouth daily   10/22/2017 at Unknown time       Vitals: Blood pressure 157/75, pulse 65, temperature 97 5 °F (36 4 °C), temperature source Oral, resp  rate 18, height 5' 9" (1 753 m), weight 68 1 kg (150 lb 2 1 oz), SpO2 94 %  Body mass index is 22 17 kg/m²       ABG: No results found for: PHART, [id number], PO2ART, [id number], I9AMQOMG, BEART, SOURCE      Intake/Output Summary (Last 24 hours) at 10/25/17 0757  Last data filed at 10/25/17 0640   Gross per 24 hour   Intake             1450 ml   Output             2100 ml   Net             -650 ml       Invasive Devices     Peripheral Intravenous Line            Peripheral IV 10/23/17 Left Antecubital 1 day                          Nutrition/GI Proph/Bowel Reg: Regular    Physical Exam:   GENERAL APPEARANCE: Patient in no acute distress, oriented to person only  HEENT: NCAT; PERRL, EOMs intact; Mucous membranes moist  CV: Regular rate and rhythm; + S1, S2; no murmur/gallops/rubs appreciated, no complaint of chest pain  LUNGS: Clear to auscultation; no wheezes/rales/rhonci, no obvious shortness of   ABD: NABS; soft; non-distended; non-tender  EXT: +2 pulses bilaterally upper & lower extremities; no clubbing/cyanosis/edema  NEURO: GCS 15; no focal neurologic deficits; neurovascularly intact  SKIN: Warm, dry and well perfused; no rash; no jaundice  Lab Results: Results for Abdulaziz Stewart (MRN [id number]) as of 10/25/2017 07:54   Ref   Range 10/25/2017 04:39   eGFR Latest Units: ml/min/1 73sq m 85   Sodium Latest Ref Range: 136 - 145 mmol/L 140   Potassium Latest Ref Range: 3 5 - 5 3 mmol/L 3 9   Chloride Latest Ref Range: 100 - 108 mmol/L 104   CO2 Latest Ref Range: 21 - 32 mmol/L 29   Anion Gap Latest Ref Range: 4 - 13 mmol/L 7   BUN Latest Ref Range: 5 - 25 mg/dL 12   Creatinine Latest Ref Range: 0 60 - 1 30 mg/dL 0 78   Glucose Latest Ref Range: 65 - 140 mg/dL 70   Calcium Latest Ref Range: 8 3 - 10 1 mg/dL 8 8   Folate Latest Ref Range: 3 1 - 17 5 ng/mL >20 0 (H)   Vitamin B-12 Latest Ref Range: 100 - 900 pg/mL 336     Imaging/EKG Studies: HCT pending  Other Studies: none  VTE Prophylaxis: SCD's

## 2017-10-25 NOTE — PLAN OF CARE
DISCHARGE PLANNING     Discharge to home or other facility with appropriate resources Progressing        DISCHARGE PLANNING - CARE MANAGEMENT     Discharge to post-acute care or home with appropriate resources Progressing        INFECTION - ADULT     Absence or prevention of progression during hospitalization Progressing        Knowledge Deficit     Patient/family/caregiver demonstrates understanding of disease process, treatment plan, medications, and discharge instructions Progressing        Nutrition/Hydration-ADULT     Nutrient/Hydration intake appropriate for improving, restoring or maintaining nutritional needs Progressing        PAIN - ADULT     Verbalizes/displays adequate comfort level or baseline comfort level Progressing        Potential for Falls     Patient will remain free of falls Progressing        Prexisting or High Potential for Compromised Skin Integrity     Skin integrity is maintained or improved Progressing        SAFETY ADULT     Maintain or return to baseline ADL function Progressing     Maintain or return mobility status to optimal level Progressing     Patient will remain free of falls Progressing        SAFETY,RESTRAINT: NV/NON-SELF DESTRUCTIVE BEHAVIOR     Remains free of harm/injury (restraint for non violent/non self-detsructive behavior) Progressing     Returns to optimal restraint-free functioning Progressing

## 2017-10-25 NOTE — OCCUPATIONAL THERAPY NOTE
[address] Evaluation     Patient Name: Thor Hensley  [id number] Date: 10/25/2017  Problem List  Patient Active Problem List   Diagnosis    [facility] )    Dementia    HTN (hypertension)    Fall from standing    Scalp hematoma    On aspirin at home    HLD (hyperlipidemia)    Protein calorie malnutrition [facility] )    Ambulatory dysfunction    Delirium    Incontinence    Physical deconditioning     Past Medical History  Past Medical History:   Diagnosis Date    Cancer ([facility] )     prostate    Dementia     Ehrlichiosis     GERD (gastroesophageal reflux disease)     Hematuria     Hypertension     Lyme disease     Neoplasm of prostate      Past Surgical History  Past Surgical History:   Procedure Laterality Date    BLADDER SUSPENSION      HIP ARTHROPLASTY      SHOULDER SURGERY Right          10/25/17 9207   Note Type   Note type Eval/Treat   Restrictions/Precautions   Weight Bearing Precautions Per Order No   Other Precautions Cognitive; Chair Alarm; Bed Alarm; Restraints;Multiple lines; Fall Risk;Pain   Pain Assessment   Pain Assessment FLACC   Pain Score No Pain   Pain Rating: FLACC (Rest) - Face 0   Pain Rating: FLACC (Rest) - Legs 0   Pain Rating: FLACC (Rest) - Activity 0   Pain Rating: FLACC (Rest) - Cry 0   Pain Rating: FLACC (Rest) - Consolability 0   Score: FLACC (Rest) 0   Pain Rating: FLACC (Activity) - Face 0   Pain Rating: FLACC (Activity) - Legs 0   Pain Rating: FLACC (Activity) - Activity 0   Pain Rating: FLACC (Activity) - Cry 1   Pain Rating: FLACC (Activity) - Consolability 0   Score: FLACC (Activity) 1   Home Living   Type of Home Assisted living   Home Layout One level   Bathroom Accessibility Accessible   Home Equipment Walker   Additional Comments PT'S SON PRESENT AND REPORTS PT USES RW AT BASELINE, HOWEVER "FORGETS" TO USE IT OFTEN    Prior Function   Level of Busy Needs assistance with ADLs and functional mobility; Needs assistance with IADLs Lives With Facility staff   Receives Help From Personal care attendant   ADL Assistance Needs assistance   IADLs Needs assistance   Falls in the last 6 months 1 to 4   Vocational Retired   Lifestyle   Autonomy PT Mar[address]  64  ADLS/IADLS  AT BASELINE    Reciprocal Relationships FROM care home  PT HAS SUPPORTIVE SON PRESENT FOR OT SESSION    Service to Others RETIRED   Intrinsic Gratification PT WISHED TO READ THE NEWSPAPER    ADL   Eating Assistance 5  Supervision/Setup   Grooming Assistance 5  Supervision/Setup   UB Bathing Assistance 4  Minimal Assistance   LB Bathing Assistance 2  Maximal Assistance   UB Dressing Assistance 4  Minimal Assistance   LB Dressing Assistance 2  Maximal [address]  2  Maximal Assistance   Functional Assistance 2  Maximal Assistance   Bed Mobility   Supine to Sit 3  Moderate assistance   Additional items Assist x 1; Increased time required;HOB elevated;Verbal cues   Additional Comments PT LEFT OOB WITH SADI BELT AND CHAIR ALARM ON    Transfers   Sit to Stand 3  Moderate assistance   Additional items Assist x 1; Increased time required;Verbal cues   Stand to Sit 3  Moderate assistance   Additional items Assist x 1; Increased time required;Verbal cues   Functional Mobility   Functional Mobility 3  Moderate assistance   Additional Comments AX2; PT REQUIRED VERBAL/VISUAL/TACTILE CUES FOR SAFE USE OF RW AND SEQUENCING THROUGH MOBILITY      Additional items Rolling walker   Balance   Static Sitting Fair +   Static Standing Poor +   Ambulatory Poor -   Activity Tolerance   Activity Tolerance Patient limited by fatigue;Treatment limited secondary to medical complications (Comment)   Nurse Made Aware APPROPRIATE TO SEE PER RN    RUE Assessment   RUE Assessment WFL   RUE Strength   RUE Overall Strength Within Functional Limits - able to perform ADL tasks with strength   LUE Assessment   LUE Assessment WFL   LUE Strength   LUE Overall Strength Within Functional Limits - able to perform ADL tasks with strength   Hand Function   Gross Motor Coordination Functional   Fine Motor Coordination Functional   Cognition   Overall Cognitive Status Impaired   Arousal/Participation Responsive   Attention Attends with cues to redirect   Orientation Level Oriented to person;Disoriented to place; Disoriented to time;Disoriented to situation   Memory Decreased recall of precautions;Decreased recall of recent events;Decreased short term memory;Decreased recall of biographical information;Decreased long term memory   Following Commands Follows one step commands without difficulty   Comments PT IS PLEASANT AND COOPERATIVE  BASELINE DEMENTIA  SON PRESENT AND REPORTS PT ORIENTED TO PERSON ONLY AT BASELINE  LIMITED SAFETY AWARENESS/INSIGHT/JUDGEMENT  PT WITH POSEY BELT AND CHAIR ALARM   Assessment   Limitation Decreased ADL status; Decreased Safe judgement during ADL;Decreased cognition;Decreased endurance;Decreased self-care trans;Decreased high-level ADLs   Prognosis Fair   Assessment 81 YO MALE SEEN FOR INITIAL OT EVAL S/P FALL WITH HEMATOMA ON L FRONTAL REGION  DX INCLUDES L INTRAVENTRICULAR HEMORRHAGE  PT WITH SIGNIFICANT ROBLEMS LIST INCLUDING BASELINE DEMENTIA, PROSTATE CA, AND HTN  PT IS A POOR HISTORIAN 2' BASELINE DEMENTIA, INFORMATION OBTAINED FROM PT'S SON WHO WAS PRESENT FOR OT SESSION  PT FROM [facility] WHERE HE REQUIRED ASSISTANCE FOR ADLS/IADLS AT BASELINE  SON REPORTS HE USES RW, HOWEVER, "FORGETS" TO USE IT AND HAS HAD MULTIPLE FALLS  PT'S SON REPORTS PT DOES NOT HAVE 24 HOUR SUPERVISION AT FACILITY (?)  PT CURRENTLY REQUIRES OVERALL MIN A FOR UB ADLS, MAX A FOR LB ADLS, MOD A FOR TRANSFERS AND MOD A X2 FOR LIMITED FUNCTIONAL MOBILITY WITH USE OF RW  PT IS LIMITED 2' FATIGUE, IMPAIRED BALANCE, OVERALL WEAKNESS, BASELINE DEMENTIA WITH DELIRIUM, LIMITED INSIGHT/JUDGEMENT/SAFETY AWARENESS, REQUIRING RESTRAINTS AND LIMITED ACTIVITY TOLERANCE  PT'S SON REPORTS PT IS FUNCTIONING BELOW BASELINE   FROM AN OT PERSPECTIVE, PT WOULD BENEFIT FROM CONT OT SERVICES IN AN INPT REHAB SETTING WITH POSSIBLE LT PLACEMENT WITH ADDITIONAL ASSISTANCE REQUIRED  WILL CONT TO FOLLOW  Goals   Patient Goals NONE EXPRESSED    LTG Time Frame 10-14   Long Term Goal #1 SEE BELOW    Plan   Treatment Interventions ADL retraining;Functional transfer training; Endurance training;Cognitive reorientation;Patient/family training;Equipment evaluation/education; Activityengagement; Energy conservation   Goal Expiration Date 11/08/17   OT Frequency 2-3x/wk   Recommendation   OT Discharge Recommendation Short Term Rehab  (+ POSSIBLE LT PLACEMENT WITH ADDITIONAL ASSIST/SUPERVISION)   OT - OK to Discharge Yes  (WHEN MEDICALLY CLEARED  )   Barthel Index   Feeding 5   Bathing 0   Grooming Score 0   Dressing Score 0   Bladder Score 5   Bowels Score 5   Toilet Use Score 5   Transfers (Bed/Chair) Score 5   Mobility (Level Surface) Score 0   Stairs Score 0   Barthel Index Score 25   Modified Yuan Scale   Modified Yuan Scale 4       OCCUPATIONAL THERAPY GOALS TO BE MET WITHIN 10-14 DAYS:    -Pt will complete additional cognitive assessment with 100% attention to task in order to assist with safe d/c plan  -Pt will increase bed mobility to S LEVEL to participate in functional activities with G tolerance and balance  -Pt will improve functional mobility and transfers to S LEVEL on/off all surfaces w/ G balance/safety including toileting   -Pt will participate in lt grooming/self-feeding task with SUPERVISION after set-up sitting EOB for ~10 minutes with G safety and balance  -Pt will increase independence in all ADLS to MIN A with G balance sitting upright in chair   -Pt will improve activity tolerance to G for 20 min txment sessions   -Caregiver will be 100% attentive during caregiver training to better assist pt needs       Documentation completed by MATHEW Rivera, OTR/L

## 2017-10-26 ENCOUNTER — APPOINTMENT (INPATIENT)
Dept: RADIOLOGY | Facility: HOSPITAL | Age: 82
DRG: 086 | End: 2017-10-26
Payer: MEDICARE

## 2017-10-26 PROCEDURE — 92526 ORAL FUNCTION THERAPY: CPT

## 2017-10-26 PROCEDURE — 70450 CT HEAD/BRAIN W/O DYE: CPT

## 2017-10-26 PROCEDURE — 97116 GAIT TRAINING THERAPY: CPT

## 2017-10-26 RX ADMIN — ACETAMINOPHEN 650 MG: 325 TABLET, FILM COATED ORAL at 05:45

## 2017-10-26 RX ADMIN — GABAPENTIN 100 MG: 100 CAPSULE ORAL at 21:59

## 2017-10-26 RX ADMIN — METOPROLOL TARTRATE 5 MG: 5 INJECTION, SOLUTION INTRAVENOUS at 05:57

## 2017-10-26 RX ADMIN — MEMANTINE HYDROCHLORIDE 10 MG: 10 TABLET ORAL at 17:31

## 2017-10-26 RX ADMIN — ACETAMINOPHEN 650 MG: 325 TABLET, FILM COATED ORAL at 21:59

## 2017-10-26 RX ADMIN — PRAVASTATIN SODIUM 40 MG: 40 TABLET ORAL at 16:24

## 2017-10-26 RX ADMIN — ACETAMINOPHEN 650 MG: 325 TABLET, FILM COATED ORAL at 14:18

## 2017-10-26 RX ADMIN — Medication 1 TABLET: at 21:59

## 2017-10-26 RX ADMIN — SODIUM CHLORIDE, SODIUM GLUCONATE, SODIUM ACETATE, POTASSIUM CHLORIDE, MAGNESIUM CHLORIDE, SODIUM PHOSPHATE, DIBASIC, AND POTASSIUM PHOSPHATE 75 ML/HR: .53; .5; .37; .037; .03; .012; .00082 INJECTION, SOLUTION INTRAVENOUS at 02:01

## 2017-10-26 RX ADMIN — FAMOTIDINE 20 MG: 20 TABLET, FILM COATED ORAL at 16:24

## 2017-10-26 NOTE — PHYSICAL THERAPY NOTE
Physical Therapy Progress Note     10/26/17 1145   Pain Assessment   Pain Assessment No/denies pain   Pain Score No Pain   Restrictions/Precautions   Other Precautions Impulsive;Cognitive; Chair Alarm; Bed Alarm; Fall Risk  (Alarm active post session )   Subjective   Subjective The pt  states that he feels humiliated, and he wants to "get something from over here" indicating another patient's room  He was agreeable to walk with therapy in the hallway after making his bed  Transfers   Sit to Stand 5  Supervision   Additional items Impulsive;Verbal cues   Stand to Sit 4  Minimal assistance   Additional items Assist x 1; Increased time required;Verbal cues   Ambulation/Elevation   Gait pattern Excessively slow; Short stride; Foward flexed; Inconsistent drew; Shuffling; Festenating;Decreased foot clearance; Wide GELACIO; Improper Weight shift   Gait Assistance 3  Moderate assist   Additional items Assist x 1;Verbal cues; Tactile cues   Assistive Device Rolling walker   Distance 260 feet total with multiple standing rests  Balance   Static Sitting Fair -   Dynamic Sitting Fair -   Static Standing Poor +   Ambulatory Poor   Activity Tolerance   Activity Tolerance Patient tolerated treatment well;Patient limited by fatigue   Nurse Made Aware More Escobar RN  Assessment   Prognosis Fair   Problem List Decreased strength;Decreased endurance; Impaired balance;Decreased mobility; Decreased coordination;Decreased cognition;Decreased safety awareness; Impaired judgement   Assessment The pt  is repeatedly attempting to get up without assistance, and he is difficult to redirect  He was ambulating around in his room by holding on to furniture, and with assistance around his waist  He had improved balance with the rolling walker, but he did continue to attempt to enter other patient rooms  He was able to be redirected for brief periods of time  However, he would require continued redirection to task   He did grow fatigued with increased incidence of festination, but with instruction and visual cues for larger steps he would take more appropriate steps  He did fatigue, and he was placed in the chair with the alarm active  Barriers to Discharge Inaccessible home environment;Decreased caregiver support   Goals   Patient Goals To get something from the other room  LTG Expiration Date 11/07/17   Treatment Day 2   Plan   Treatment/Interventions Functional transfer training;LE strengthening/ROM; Therapeutic exercise; Endurance training;Cognitive reorientation;Patient/family training;Bed mobility;Gait training   Progress Slow progress, cognitive deficits   PT Frequency 2-3x/wk   Recommendation   Recommendation Post acute IP rehab   Equipment Recommended Bell Gonzalez PTA

## 2017-10-26 NOTE — PROGRESS NOTES
Progress Note - Cleopatra Talamantes [de-identified] y o  male MRN: [id number]    Unit/Bed#: [facility] 907-01 Encounter: [id number]      Assessment/Plan  1  Delirium  Patient much better today  No antipsychotics in last 24 hours  Continue with gabapentin 100mg QHS  Tylenol 650 mg Q 8 added, agree  Consider adding lidoderm patch, low dose oxycodone 3 2LG prn Q4 if applicable  Agree with bowel regimen, continue with MiraLax, patient has had multiple BMs  Redirect unwanted patient behaviors as first line tx  Reorient patient frequently  Avoid deliriogenic meds including tramadol, benzodiazepines, benadryl  Good sleep hygiene important, limit night time interruptions  Encourage patient to stay awake during the day  Ensure adequate hydration/nutrition  Mobilize often      2  Dementia   Appears to be near baseline, A*O x self, conversational, pleasant  No antipsychotics in last 24 hours  B12, folate, TSH within normal limits  Consider taper of namenda as outpatient   Continue supportive care, see #1     3  Fall  Fall precautions  Suspicious home meds, Namenda  Consider taper of Namenda as outpatient  PT, OT recommending short-term skilled PT, long-term skilled nursing home placement, agree     4  Ambulatory dysfunction  Ambulates with a rolled walker at baseline  PT, OT recommending short-term skilled PT, long-term skilled nursing home placement, agree  Patient may benefit from rehab to improve gait stability and strength  Consider adding vitamin D3 1000 international units daily to patient's medication regimen     5  Incontinence  Recommend frequent toileting every 2-3 hours to prevent incontinent episodes  Recommend changing chucks frequently to prevent skin breakdown     6  Deconditioning  Mobilize frequently to prevent further decline  PT OT recommending a rehab, skilled nursing facility long-term  Rehab     7  Intraventricular hemorrhage  NSx consulted, NOM recommended, agree       8   Hypoalbuminemia  Speech following, helping patient with lunch at this time    Subjective:   No acute events overnight  No antipsychotics in last 24 hours  Patient A*O x self, pleasant  Speech at bedside  PROS cannot be gathered secondary to dementia  Objective:     Vitals: Blood pressure (!) 174/82, pulse 55, temperature 97 5 °F (36 4 °C), temperature source Oral, resp  rate 20, height 5' 9" (1 753 m), weight 68 1 kg (150 lb 2 1 oz), SpO2 95 %  ,Body mass index is 22 17 kg/m²  Intake/Output Summary (Last 24 hours) at 10/26/17 0828  Last data filed at 10/26/17 0701   Gross per 24 hour   Intake             1615 ml   Output             1080 ml   Net              535 ml       Physical Exam: General : WD in NAD  HEENT : MMM no erythema or exudates  EOMI, sclera anicteric  Heart : Normal rate  Lungs : CTA  Abdomen : Soft, NT/ND, BS auscultated in all 4 quads  No organomegaly  Ext :  Pulses +2/4 B/L  Neg edema B/L  Neg calf swelling B/L  Skin : Pink, warm, dry, age appropriate turgor and mobility  Neuro : Nonfocal  Psych : A * O x self, pleasant       Invasive Devices     Peripheral Intravenous Line            Peripheral IV 10/23/17 Left Antecubital 2 days                Lab, Imaging and other studies: I have personally reviewed pertinent reports      VTE Pharmacologic Prophylaxis: Sequential compression device (Venodyne)   VTE Mechanical Prophylaxis: sequential compression device

## 2017-10-26 NOTE — PROGRESS NOTES
Progress Note - Trauma   Rubens Little [de-identified] y o  male MRN: [id number]  [facility] Encounter: [id number]        Assessment/Plan: S/P Fall    IVH        -  Neuro checks        -  Neurosurgery following     2  Dementia        -  Geriatrics following        -  Neurontin ordered as suggested, patient slept all night     3  Scalp hematoma        -  Soft     4  PTOt     5  Pain management         -  Po meds      6  DVT prophylaxis        -  SCD's     7  Disposition         - case management  Chief Complaint: none    Subjective: I don't remember    Meds/Allergies   Prescriptions Prior to Admission   Medication Sig Dispense Refill Last Dose    aspirin 81 mg chewable tablet Chew 81 mg daily   10/23/2017 at Unknown time    esomeprazole (NexIUM) 40 MG capsule Take 40 mg by mouth every morning before breakfast   10/23/2017 at Unknown time    lisinopril-hydrochlorothiazide (PRINZIDE,ZESTORETIC) 20-12 5 MG per tablet Take 1 tablet by mouth daily       memantine (NAMENDA) 10 mg tablet Take 10 mg by mouth 2 (two) times a day   10/22/2017 at Unknown time    pravastatin (PRAVACHOL) 40 mg tablet Take 40 mg by mouth daily   10/22/2017 at Unknown time       Vitals: Blood pressure (!) 174/82, pulse 55, temperature 97 5 °F (36 4 °C), temperature source Oral, resp  rate 20, height 5' 9" (1 753 m), weight 68 1 kg (150 lb 2 1 oz), SpO2 95 %  Body mass index is 22 17 kg/m²       ABG: No results found for: PHART, FLO3ZGN, PO2ART, HKM5LHC, B5IKGPIE, BEART, SOURCE      Intake/Output Summary (Last 24 hours) at 10/26/17 0809  Last data filed at 10/26/17 0701   Gross per 24 hour   Intake             1615 ml   Output             1080 ml   Net              535 ml       Invasive Devices     Peripheral Intravenous Line            Peripheral IV 10/23/17 Left Antecubital 2 days                          Nutrition/GI Proph/Bowel Reg: npo vs diet, will follow up with speech    Physical Exam:   GENERAL APPEARANCE: Patient in no acute distress, upset when he doesn't remember  HEENT: NCAT; PERRL, EOMs intact; Mucous membranes moist  CV: Regular rate and rhythm; + S1, S2; no murmur/gallops/rubs appreciated, no complaints of chest pain  LUNGS: Clear to auscultation; slight rhonchi, clears with cough, encourage incentive spirometer  ABD: NABS; soft; non-distended; non-tender  EXT: +2 pulses bilaterally upper & lower extremities; moving all four extremities  NEURO: GCS 15; no focal neurologic deficits; neurovascularly intact  SKIN: Warm, dry and well perfused      Lab Results: none  Imaging/EKG Studies: HCT to review with Neurosurgery  Other Studies: none  VTE Prophylaxis: SCD's

## 2017-10-26 NOTE — SPEECH THERAPY NOTE
Speech Language/Pathology                             SLP  Note  Patient Name: Selene Appiah  SWMNQ'L Date: 10/26/2017     Problem List  Patient Active Problem List   Diagnosis    [facility]ventricular hemorrhage ([facility] )    Dementia    HTN (hypertension)    Fall from standing    Scalp hematoma    On aspirin at home    HLD (hyperlipidemia)    [facility] malnutrition ([facility] )    Ambulatory dysfunction    Delirium    Incontinence    Physical deconditioning     Past Medical History  Past Medical History:   Diagnosis Date    Cancer ([facility] )     prostate    Dementia     Ehrlichiosis     GERD (gastroesophageal reflux disease)     Hematuria     Hypertension     Lyme disease     Neoplasm of prostate      Past Surgical History  Past Surgical History:   Procedure Laterality Date    BLADDER SUSPENSION      HIP ARTHROPLASTY      SHOULDER SURGERY Right          Subjective:  Pt sleeping  Alerted when seated upright, kept eyes closed  Objective:  Cog: pt screams when spoken to, touched, face wiped (even when told prior what I was going to do)  Will not open eyes  I managed to get small amounts of hot cereal in (after he bit down on the spoon)  Needed multiple swallows, and coughed w/ all trials  Nurse reported he awake earlier and was having a conversation w/ a student, and that he took his meds crushed wfl  Status appears to wax and wane  Reportedly had neurontin last night and slept wfl  Assessment:  Not appropriate for po at this time  Status fluctuating  Plan/Recommendations:  Spoke w/ nurse re NPO  Also, it pt shows a signif improvement later, call the speech dept at  for re-assessment

## 2017-10-26 NOTE — PLAN OF CARE
Problem: SLP ADULT - SWALLOWING, IMPAIRED  Goal: Advance to least restrictive diet without signs or symptoms of aspiration for planned discharge setting  See evaluation for individualized goals    Outcome: Progressing

## 2017-10-26 NOTE — SPEECH THERAPY NOTE
Speech Language/Pathology                             SLP  Note  Patient Name: Rubens Little  HNAQO'N Date: 10/26/2017     Problem List  Patient Active Problem List   Diagnosis    [facility]    Dementia    HTN (hypertension)    Fall from standing    Scalp hematoma    On aspirin at home    HLD (hyperlipidemia)    Protein calorie malnutrition [facility] )    Ambulatory dysfunction    Delirium    Incontinence    Physical deconditioning     Past Medical History  Past Medical History:   Diagnosis Date    Cancer ([facility] )     prostate    Dementia     Ehrlichiosis     GERD (gastroesophageal reflux disease)     Hematuria     Hypertension     Lyme disease     Neoplasm of prostate      Past Surgical History  Past Surgical History:   Procedure Laterality Date    BLADDER SUSPENSION      HIP ARTHROPLASTY      SHOULDER SURGERY Right          Subjective:  Received call from nurse that pt was more alert and cooperative  Requested ST to return to room  Pt sitting upright in chair, awake and pleasant but confused  Wet vocal quality noted prior to the meal     Objective:  Pt ate a portion of his lunch, feeding himself, including roast beef and mashed potatoes with gravy, cooked carrots, a roll with butter,  and chopped pears  Bolus retrieval, draw from straw, mastication, bolus formation and transfer appear WFL  No residue noted  He drank 4 oz thin juice by straw, with three instances of coughing  He drank 3 oz NTL by straw, with no cough  Occasional slightly wet vocal quality noted before, during, and after the meal, and not after a certain consistency or certain food/liquid  O2 sats remained in the high 90's during the meal, and immediately after  Pt able to cough and clear on request  Hyolaryngeal elevation was palpated and appears adequate  Swallows appear timely  Results reviewed with pt, RN and trauma team  Sign posted      Assessment:  Pt appears to tolerate regular consistency foods; intermittent s/s of clinical aspiration with thin liquids  Tolerated NTL without clinical s/s of aspiration  Wet vocal quality noted during session, intermittently  ? Allergies, postnasal drip vs penetration of liquid/saliva? Alertness level apparently waxes and wanes  Pt is not able to utilize compensatory techniques due to dementia  Plan/Recommendations:  Regular diet and nectar thick liquids; straws ok  Meds: Whole with NTL or in applesauce  Intermittent supervision  Encourage small bites/sips, feed only when fully alert, encourage pt to cough when voice is wet  Aspiration and reflux precautions  ST to follow up for diet toleration and for trials of thin liquid  May need to consider VBS if continued s/s of aspiration with thin liquid

## 2017-10-26 NOTE — PLAN OF CARE
Problem: PHYSICAL THERAPY ADULT  Goal: Performs mobility at highest level of function for planned discharge setting  See evaluation for individualized goals  Treatment/Interventions: Functional transfer training, LE strengthening/ROM, Therapeutic exercise, Endurance training, Equipment eval/education, Bed mobility, Gait training, Spoke to nursing  Equipment Recommended: Porfirio Antunez       See flowsheet documentation for full assessment, interventions and recommendations  Ceci Zheng    Outcome: Progressing  Prognosis: Fair  Problem List: Decreased strength, Decreased endurance, Impaired balance, Decreased mobility, Decreased coordination, Decreased cognition, Decreased safety awareness, Impaired judgement  Assessment: The pt  is repeatedly attempting to get up without assistance, and he is difficult to redirect  He was ambulating around in his room by holding on to furniture, and with assistance around his waist  He had improved balance with the rolling walker, but he did continue to attempt to enter other patient rooms  He was able to be redirected for brief periods of time  However, he would require continued redirection to task  He did grow fatigued with increased incidence of festination, but with instruction and visual cues for larger steps he would take more appropriate steps  He did fatigue, and he was placed in the chair with the alarm active  Barriers to Discharge: Inaccessible home environment, Decreased caregiver support     Recommendation: Post acute IP rehab     PT - OK to Discharge: Yes (to STR when medically appropriate )    See flowsheet documentation for full assessment

## 2017-10-26 NOTE — RESTORATIVE TECHNICIAN NOTE
Restorative Specialist Mobility Note       Activity: Ambulate in welch, Chair (24 mins, ast c PT, left pt c PT)     Assistive Device: Front wheel walker     Ambulation Response: Tolerated fairly well  Repositioned: Sitting, Up in chair  Range of Motion: Active, All extremities   Pt left resting comfortably in bedside recliner, with chair alarm activated and call bell/bedside table within reach

## 2017-10-26 NOTE — SOCIAL WORK
Cm reviewed patient during care coordination rounds  Cm continues to work on discharge planning  Cm contacted patient's son, Lesly Arrington to discuss d/c planning  Per son he was present during yesterday's PT/OT session  He is in agreement with referral to [facility]  Cm placed referral to [facility]  Awaiting response

## 2017-10-26 NOTE — PLAN OF CARE
Problem: SLP ADULT - SWALLOWING, IMPAIRED  Goal: Initial SLP swallow eval performed  Outcome: Completed Date Met: 10/26/17

## 2017-10-27 VITALS
OXYGEN SATURATION: 97 % | TEMPERATURE: 98.6 F | RESPIRATION RATE: 18 BRPM | WEIGHT: 150.13 LBS | BODY MASS INDEX: 22.24 KG/M2 | HEART RATE: 67 BPM | SYSTOLIC BLOOD PRESSURE: 133 MMHG | HEIGHT: 69 IN | DIASTOLIC BLOOD PRESSURE: 63 MMHG

## 2017-10-27 PROCEDURE — 92526 ORAL FUNCTION THERAPY: CPT

## 2017-10-27 RX ORDER — AMOXICILLIN 250 MG
1 CAPSULE ORAL
Refills: 0
Start: 2017-10-27

## 2017-10-27 RX ORDER — GABAPENTIN 100 MG/1
100 CAPSULE ORAL
Qty: 60 CAPSULE | Refills: 0 | Status: SHIPPED | OUTPATIENT
Start: 2017-10-27

## 2017-10-27 RX ORDER — POLYETHYLENE GLYCOL 3350 17 G/17G
17 POWDER, FOR SOLUTION ORAL DAILY
Qty: 14 EACH | Refills: 0 | Status: SHIPPED | OUTPATIENT
Start: 2017-10-28

## 2017-10-27 RX ORDER — ACETAMINOPHEN 325 MG/1
TABLET ORAL
Qty: 30 TABLET | Refills: 0 | Status: SHIPPED | OUTPATIENT
Start: 2017-10-27

## 2017-10-27 RX ADMIN — ACETAMINOPHEN 650 MG: 325 TABLET, FILM COATED ORAL at 05:17

## 2017-10-27 RX ADMIN — MEMANTINE HYDROCHLORIDE 10 MG: 10 TABLET ORAL at 10:29

## 2017-10-27 RX ADMIN — FAMOTIDINE 20 MG: 20 TABLET, FILM COATED ORAL at 10:30

## 2017-10-27 RX ADMIN — ACETAMINOPHEN 650 MG: 325 TABLET, FILM COATED ORAL at 14:28

## 2017-10-27 NOTE — SPEECH THERAPY NOTE
Pt not waking up for nurse this am  He is in the chair, not opening eyes or participating  ? What his usual routine is, as he was alert at lunch time yesterday  Will attempt f/u as able

## 2017-10-27 NOTE — PROGRESS NOTES
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 CT head wo 10/26: Some decreased in occipital horn IVH without new hemorrhage  ? CT head wo 10/25: increased bilateral occipital horn IVH without hydrocephalus  ? CT head wo 10/23: age appropriate atrophy  Subcentimeter left lateral ventricular hyperdensity c/w hemorrhage  ? CT cervical spine wo 10/23: No acute fracture or malignant noted  · Mobilize as tolerated with PT/OT  · DVT PPX: SCDs  May consider HSQ if strongly indicated given stability of hemorrhage on repeat Imaging  · Continue to hold ASA  If no strong indication, consider indefinite discontinuation as patient is fall risk  · No neurosurgical intervention indicated or anticipated  No outpatient follow-up anticipated  · Sign-off  Follow-up as needed  Okay for discharge from neurosurgical standpoint  Call with questions/concerns    Subjective/Objective   Chief Complaint: "I'm fine"/Follow-up IVH    Subjective: Patient is without complaints  Denies HA, vision or speech changes  Denies weakness  Denies CP/SOB/N/V  Denies neck or back pain  Objective: Sitting up in chair  NAD  I/O       10/25 0701 - 10/26 0700 10/26 0701 - 10/27 0700    P  O  240 270    I V  (mL/kg) 1000 (14 7) 692 5 (10 2)    IV Piggyback      Total Intake(mL/kg) 1240 (18 2) 962 5 (14 1)    Urine (mL/kg/hr) 1080 (0 7) 645 (0 6)    Stool 0 (0)     Total Output 1080 645    Net +160 +317 5          Unmeasured Urine Occurrence 2 x 3 x    Unmeasured Stool Occurrence 1 x           Invasive Devices     Peripheral Intravenous Line            Peripheral IV 10/23/17 Left Antecubital 3 days                Physical Exam:  Vitals: Blood pressure 162/80, pulse 74, temperature 97 6 °F (36 4 °C), temperature source Oral, resp  rate 18, height 5' 9" (1 753 m), weight 68 1 kg (150 lb 2 1 oz), SpO2 97 %  ,Body mass index is 22 17 kg/m²      General appearance: alert, appears stated age, cooperative and no distress  Head: Normocephalic, without obvious abnormality, left frontal abrasion  Eyes: EOMI, PERRL  Neck: supple, symmetrical, trachea midline and NT  Back: no kyphosis present, no tenderness to percussion or palpation  Lungs: non labored breathing  Heart: regular heart rate  Neurologic:   Mental status: Alert, oriented x self and hospital, GCS14  Cranial nerves: grossly intact (Cranial nerves II-XII)  Sensory: normal to LT  Motor: moving all extremities without focal weakness   Coordination: finger to nose normal bilaterally, no drift bilaterally      Lab Results:    Results from last 7 days  Lab Units 10/25/17  2006 10/24/17  0541 10/23/17  1629   WBC Thousand/uL  --  11 30* 5 83   HEMOGLOBIN g/dL 14 4 14 5 14 2   HEMATOCRIT % 41 6 42 2 43 0   PLATELETS Thousands/uL  --  199 200   NEUTROS PCT %  --  79* 67   MONOS PCT %  --  9 7       Results from last 7 days  Lab Units 10/25/17  0439 10/24/17  0541 10/23/17  1629   SODIUM mmol/L 140 141 143   POTASSIUM mmol/L 3 9 3 3* 4 0   CHLORIDE mmol/L 104 103 103   CO2 mmol/L 29 30 32   BUN mg/dL 12 10 16   CREATININE mg/dL 0 78 0 74 0 94   CALCIUM mg/dL 8 8 8 7 8 8   GLUCOSE RANDOM mg/dL 70 105 109               Results from last 7 days  Lab Units 10/23/17  2035   INR  1 00   PTT seconds 24     No results found for: TROPONINT  ABG:No results found for: PHART, JWT9XOV, PO2ART, WOL3WLJ, U5XWUPZL, BEART, SOURCE    Imaging Studies: I have personally reviewed pertinent reports  and I have personally reviewed pertinent films in PACS    CT head wo 10/26:   1  No significant change in the intraventricular/subependymal hemorrhage left lateral ventricle  2   Continued decrease in the layering blood products in the occipital horns of the lateral ventricles bilaterally  3   Cerebral atrophy with chronic small vessel ischemic change  No acute intracranial abnormality  EKG, Pathology, and Other Studies: I have personally reviewed pertinent reports        VTE Pharmacologic Prophylaxis: Reason for no pharmacologic prophylaxis IVH    VTE Mechanical Prophylaxis: sequential compression device

## 2017-10-27 NOTE — DISCHARGE SUMMARY
Discharge Summary - Savita Jimenez [de-identified] y o  male MRN: [id number]    Unit/Bed#: [facility] 907-01 Encounter: [id number]    Admission Date: 10/23/2017   Discharge Date: 10/27/2017    Admitting Diagnosis: Protein calorie malnutrition ([facility] ) [E46]  Injury, unspecified, initial encounter [T14 90XA]  Intraventricular hemorrhage ([facility] ) [I61 5]  Unspecified multiple injuries, initial encounter [T07  XXXA]    HPI: As per Dr Salima Masterson who was present on patient's initial evaluation, "Savita Jimenez is a [de-identified] y o  male who presents with status post fall at nursing home  Fall was yesterday, on with this patient was found down  Patient sustained a hematoma to his left frontal region  Patient was able to get up with assistance, use a walker at baseline  Patient presented to [facility] can't is secondary to the hematoma and external signs of trauma  At [facility] patient received a CT head which showed a 0 8 x 0 8 x 0 8 left intraventricular hemorrhage  Patient is GCS 14 confused to time  This is baseline confirmed by son who is POA  Patient is pleasantly demented  Patient does not know what happened  Patient states he fell 2 weeks ago  Dementia limits history  Review of systems unobtainable "    Today the patient has no complaints  He does admit to an occasional headache  He denies dizziness or nausea  He is tolerating a diet with a decent appetite  He is ready to transfer to inpatient rehab  His son is present in the room who is transporting him  Exam:   GEN: NAD  HEENT: + left forehead abrasion  NEURO: GCA 15, non-focal exam, strength 5/5 B/L UE/LE  CV: RRR, no MGR  PULM: CTA bilaterally  GI: soft, non-tender, non-distended  : voiding   MSK: moving all equally  SKIN: pink, warm, dry    Procedures Performed: No orders of the defined types were placed in this encounter  Hospital Course: Patient was admitted and seen by neurosurgery   He was given DDAVP at [facility] for ASA reversal  Neurosurgery then recommended repeat CT head which showed increase in the IVH  He had another f/u CT scan which showed stability  He remained a GCS 14-15 during his hospitalization  He was up and ambulatory with PT who recommended discharge to inpatient rehab  Significant Findings, Care, Treatment and Services Provided:   CT head: Compared to the 8/17/2017 head CT, there is a new 0 8 x 0 8 x 0 8 cm hyperdense focus predominantly in the left lateral ventricle but possibly arising from the corpus callosum (series 2 image 29 of series 400 image 59 )  There is also additional strands   acute hemorrhagic material in the left lateral ventriclealong the left choroid plexus (series 2 images 23 )   Because isolated intraventricular hemorrhage would be unusual in the setting of trauma, the round hyperdense foci could be a hemorrhagic lesion such as AVM  Recommend contrast enhanced MRI of the brain  CT Cervical spine: No cervical spine fracture or traumatic malalignment  CT head:Increased blood products within the occipital horns which may reflect an element of progressive bleeding when compared to the prior versus redistribution  No evidence for obstructive hydrocephalus  CT head: 1  No significant change in the intraventricular/subependymal hemorrhage left lateral ventricle  2   Continued decrease in the layering blood products in the occipital horns of the lateral ventricles bilaterally  3   Cerebral atrophy with chronic small vessel ischemic change  No acute intracranial abnormality  Complications: None    Discharge Diagnosis:   [de-identified] y/o male s/p fall   Intraventricular hemorrhage stable on f/u CT head  Platelet dysfunction due to aspirin s/p Reversal with DDAVP  History of dementia    Condition at Discharge: good     Discharge instructions/Information to patient and family:   See after visit summary for information provided to patient and family        Provisions for Follow-Up Care:  See after visit summary for information related to follow-up care and any pertinent home health orders  Disposition: Short-term rehab at [facility]    Planned Readmission: No    Discharge Statement   I spent 30 minutes discharging the patient  This time was spent on the day of discharge  I had direct contact with the patient on the day of discharge  Additional documentation is required if more than 30 minutes were spent on discharge  Discharge Medications:  See after visit summary for reconciled discharge medications provided to patient and family

## 2017-10-27 NOTE — SOCIAL WORK
Pt accepted to [address]  Pt's first choice is [facility]   Pt can d/c today once a decision is made

## 2017-10-27 NOTE — PROGRESS NOTES
Progress Note - Thor Hensley [de-identified] y o  male MRN: [id number]    Unit/Bed#: [facility] 907-01 Encounter: [id number]      Assessment/plan:    1  Delirium  Patient much better today  No antipsychotics in last 24 hours  Continue with gabapentin 100mg QHS  Tylenol 650 mg Q 8 hrly  Consider adding lidoderm patch  Agree with bowel regimen, continue with MiraLax, patient has had multiple BMs  Delirium precaution as outlined previously  2  Dementia   Appears to be near baseline, A*O x self, conversational, pleasant  No antipsychotics in last 24 hours  B12, folate, TSH within normal limits  Continue supportive care    3   Fall  Fall precautions  Consider taper of Namenda as outpatient  PT, OT recommending short-term skilled PT, long-term skilled nursing home placement    4   Ambulatory dysfunction   Fall precaution  PT, OT recommending short-term skilled PT, long-term skilled nursing home placement, agree  Patient may benefit from rehab to improve gait stability and strength  Consider adding vitamin D3 1000 international units daily to patient's medication regimen   5   Incontinence  Recommend scheduled toileting every 2-3 hours  When awake to prevent accidents   Recommend changing chucks frequently to prevent skin breakdown   6   Deconditioning  Mobilize frequently to prevent further decline  PT OT recommending a rehab, skilled nursing facility long-term  Rehab   7  Intraventricular hemorrhage  Management per Neurosurgery  8  Hypoalbuminemia  Speech following, helping patient with lunch at this time    Subjective:   Patient seen and examined for follow up, Pleasently confused, Oriented to self, Per his son present in the room his DAD is at his baseline for cognition  Patient denies, chest pain, sob, nausea/vomiting  His appetite is good and was able to eat all his lunch today  Complains of minimal pain at the area of his trauma on the left side of the fore head    Objective:     Vitals: Blood pressure 150/68, pulse (!) 52, temperature 97 5 °F (36 4 °C), temperature source Oral, resp  rate 18, height 5' 9" (1 753 m), weight 68 1 kg (150 lb 2 1 oz), SpO2 98 %  ,Body mass index is 22 17 kg/m²  Intake/Output Summary (Last 24 hours) at 10/27/17 1049  Last data filed at 10/27/17 0501   Gross per 24 hour   Intake            677 5 ml   Output              975 ml   Net           -297 5 ml       Physical Exam:   General : WD in NAD  HEENT : MMM no erythema or exudates  EOMI, sclera anicteric  Wound on the left side of the fore head  Heart : Normal rate, No runs, No gallops  Lungs : CTABL, No rales, no rhonchi, No wheeze  Abdomen : Soft, NT/ND, BS auscultated in all 4 quads  No organomegaly  Ext :  Pulses +2/4 B/L  Neg edema B/L  Neg calf swelling B/L  Skin : Pink, warm, dry, age appropriate turgor and mobility  Neuro : Nonfocal  Psych : Oriented to self pleasantly confused  Invasive Devices     Peripheral Intravenous Line            Peripheral IV 10/23/17 Left Antecubital 3 days            CT Head: Claudia Rubio significant change in the intraventricular/subependymal hemorrhage left lateral ventricle  2   Continued decrease in the layering blood products in the occipital horns of the lateral ventricles bilaterally  3   Cerebral atrophy with chronic small vessel ischemic change   No acute intracranial abnormality  Lab, Imaging and other studies: I have personally reviewed pertinent reports

## 2017-10-27 NOTE — PLAN OF CARE
SAFETY,RESTRAINT: NV/NON-SELF DESTRUCTIVE BEHAVIOR     Remains free of harm/injury (restraint for non violent/non self-detsructive behavior) Completed     Returns to optimal restraint-free functioning Completed

## 2018-03-08 ENCOUNTER — APPOINTMENT (EMERGENCY)
Dept: RADIOLOGY | Facility: HOSPITAL | Age: 83
End: 2018-03-08
Payer: MEDICARE

## 2018-03-08 ENCOUNTER — HOSPITAL ENCOUNTER (EMERGENCY)
Facility: HOSPITAL | Age: 83
Discharge: HOME | End: 2018-03-08
Attending: EMERGENCY MEDICINE
Payer: MEDICARE

## 2018-03-08 VITALS
WEIGHT: 180 LBS | BODY MASS INDEX: 26.58 KG/M2 | TEMPERATURE: 97.9 F | SYSTOLIC BLOOD PRESSURE: 152 MMHG | DIASTOLIC BLOOD PRESSURE: 79 MMHG | HEART RATE: 80 BPM | OXYGEN SATURATION: 98 % | RESPIRATION RATE: 22 BRPM

## 2018-03-08 DIAGNOSIS — R29.6 MULTIPLE FALLS: Primary | ICD-10-CM

## 2018-03-08 LAB
ANION GAP SERPL CALCULATED.3IONS-SCNC: 5 MMOL/L (ref 4–13)
BACTERIA UR QL AUTO: ABNORMAL /HPF
BASOPHILS # BLD AUTO: 0.02 THOUSANDS/ΜL (ref 0–0.1)
BASOPHILS NFR BLD AUTO: 0 % (ref 0–1)
BILIRUB UR QL STRIP: NEGATIVE
BUN SERPL-MCNC: 23 MG/DL (ref 5–25)
CALCIUM SERPL-MCNC: 8.4 MG/DL (ref 8.3–10.1)
CHLORIDE SERPL-SCNC: 100 MMOL/L (ref 100–108)
CLARITY UR: CLEAR
CO2 SERPL-SCNC: 32 MMOL/L (ref 21–32)
COLOR UR: YELLOW
CREAT SERPL-MCNC: 0.93 MG/DL (ref 0.6–1.3)
EOSINOPHIL # BLD AUTO: 0.32 THOUSAND/ΜL (ref 0–0.61)
EOSINOPHIL NFR BLD AUTO: 3 % (ref 0–6)
ERYTHROCYTE [DISTWIDTH] IN BLOOD BY AUTOMATED COUNT: 12.7 % (ref 11.6–15.1)
GFR SERPL CREATININE-BSD FRML MDRD: 77 ML/MIN/1.73SQ M
GLUCOSE SERPL-MCNC: 102 MG/DL (ref 65–140)
GLUCOSE UR STRIP-MCNC: NEGATIVE MG/DL
HCT VFR BLD AUTO: 40.4 % (ref 36.5–49.3)
HGB BLD-MCNC: 14 G/DL (ref 12–17)
HGB UR QL STRIP.AUTO: ABNORMAL
HYALINE CASTS #/AREA URNS LPF: ABNORMAL /LPF
KETONES UR STRIP-MCNC: NEGATIVE MG/DL
LEUKOCYTE ESTERASE UR QL STRIP: NEGATIVE
LYMPHOCYTES # BLD AUTO: 1.46 THOUSANDS/ΜL (ref 0.6–4.47)
LYMPHOCYTES NFR BLD AUTO: 15 % (ref 14–44)
MCH RBC QN AUTO: 32.1 PG (ref 26.8–34.3)
MCHC RBC AUTO-ENTMCNC: 34.7 G/DL (ref 31.4–37.4)
MCV RBC AUTO: 93 FL (ref 82–98)
MONOCYTES # BLD AUTO: 0.92 THOUSAND/ΜL (ref 0.17–1.22)
MONOCYTES NFR BLD AUTO: 10 % (ref 4–12)
NEUTROPHILS # BLD AUTO: 6.81 THOUSANDS/ΜL (ref 1.85–7.62)
NEUTS SEG NFR BLD AUTO: 72 % (ref 43–75)
NITRITE UR QL STRIP: NEGATIVE
NON-SQ EPI CELLS URNS QL MICRO: ABNORMAL /HPF
NRBC BLD AUTO-RTO: 0 /100 WBCS
PH UR STRIP.AUTO: 7.5 [PH] (ref 4.5–8)
PLATELET # BLD AUTO: 228 THOUSANDS/UL (ref 149–390)
PMV BLD AUTO: 9.7 FL (ref 8.9–12.7)
POTASSIUM SERPL-SCNC: 4 MMOL/L (ref 3.5–5.3)
PROT UR STRIP-MCNC: ABNORMAL MG/DL
RBC # BLD AUTO: 4.36 MILLION/UL (ref 3.88–5.62)
RBC #/AREA URNS AUTO: ABNORMAL /HPF
SODIUM SERPL-SCNC: 137 MMOL/L (ref 136–145)
SP GR UR STRIP.AUTO: 1.02 (ref 1–1.03)
UROBILINOGEN UR QL STRIP.AUTO: 2 E.U./DL
WBC # BLD AUTO: 9.56 THOUSAND/UL (ref 4.31–10.16)
WBC #/AREA URNS AUTO: ABNORMAL /HPF

## 2018-03-08 PROCEDURE — 85025 COMPLETE CBC W/AUTO DIFF WBC: CPT | Performed by: EMERGENCY MEDICINE

## 2018-03-08 PROCEDURE — 99285 EMERGENCY DEPT VISIT HI MDM: CPT

## 2018-03-08 PROCEDURE — 81001 URINALYSIS AUTO W/SCOPE: CPT

## 2018-03-08 PROCEDURE — 80048 BASIC METABOLIC PNL TOTAL CA: CPT | Performed by: EMERGENCY MEDICINE

## 2018-03-08 PROCEDURE — 72125 CT NECK SPINE W/O DYE: CPT

## 2018-03-08 PROCEDURE — 70450 CT HEAD/BRAIN W/O DYE: CPT

## 2018-03-08 PROCEDURE — 36415 COLL VENOUS BLD VENIPUNCTURE: CPT | Performed by: EMERGENCY MEDICINE

## 2018-03-08 NOTE — DISCHARGE INSTRUCTIONS
Fall Prevention for Older Adults   WHAT YOU NEED TO KNOW:   As you age, your muscles weaken and your risk for falls increases  Your risk also increases if you take medicines that make you sleepy or dizzy  You may also be at risk if you have vision or joint problems, have low blood pressure, or are not active  DISCHARGE INSTRUCTIONS:   Call 911 or have someone else call if:   · You have fallen and are unconscious  · You have fallen and cannot move part of your body  Contact your healthcare provider if:   · You have fallen and have pain or a headache  · You have questions or concerns about your condition or care  Fall prevention tips:   · Stay active  Exercise can help strengthen your muscles and improve your balance  Your healthcare provider may recommend water aerobics, walking, or Martin [employer]  He may also recommend physical therapy to improve your coordination  Never start an exercise program without asking your healthcare provider first     · Wear shoes that fit well and have soles that   Wear shoes both inside and outside  Use slippers with good   Avoid shoes with high heels  · Use assistive devices as directed  Your healthcare provider may suggest that you use a cane or walker to help you keep your balance  You may need to have grab bars put in your bathroom near the toilet or in the shower  · Stand or sit up slowly  This may help you keep your balance and prevent falls  · Wear a personal alarm  This is a device that allows you to call 911 if you need help  Ask for more information on personal alarms  · Manage your medical conditions  Keep all appointments with your healthcare providers  Visit your eye doctor as directed  Home safety tips:   · Add items to prevent falls in the bathroom  Put nonslip strips on your bath or shower floor to prevent you from slipping  Use a bath mat if you do not have carpet in the bathroom   This will prevent you from falling when you step out of the bath or shower  Use a shower seat so you do not need to stand while you shower  Sit on the toilet or a chair in your bathroom to dry yourself and put on clothing  This will prevent you from losing your balance from drying or dressing yourself while you are standing  · Keep paths clear  Remove books, shoes, and other objects from walkways and stairs  Place cords for telephones and lamps out of the way so that you do not need to walk over them  Tape them down if you cannot move them  Remove small rugs  If you cannot remove a rug, secure it with double-sided tape  This will prevent you from tripping  · Install bright lights in your home  Use night lights to help light paths to the bathroom or kitchen  Always turn on the light before you start walking  · Keep items you use often on shelves within reach  Do not use a step stool to help you reach an item  · Paint or place reflective tape on the edges of your stairs  This will help you see the stairs better  Follow up with your healthcare provider as directed:  Write down your questions so you remember to ask them during your visits  © 2017 [employer] Information is for End User's use only and may not be sold, redistributed or otherwise used for commercial purposes  All illustrations and images included in CareNotes® are the copyrighted property of [employer]  or [employer]  The above information is an  only  It is not intended as medical advice for individual conditions or treatments  Talk to your doctor, nurse or pharmacist before following any medical regimen to see if it is safe and effective for you

## 2018-03-08 NOTE — ED NOTES
Pt changed of soiled brief at this time  Pt changed back into clothing to go home        Moraima Monge, EARNEST  03/08/18 4696

## 2018-03-08 NOTE — ED NOTES
Pt family member at bedside states that he can transport patient back to facility at time of d/c       Natalya Correa RN  03/08/18 4082

## 2018-03-08 NOTE — ED ATTENDING ATTESTATION
Rebecca Arreola MD, saw and evaluated the patient  All available labs and X-rays were ordered by me or the resident and have been reviewed by myself  I discussed the patient with the resident / non-physician and agree with the resident's / non-physician practitioner's findings and plan as documented in the resident's / non-physician practicitioner's note, except where noted  At this point, I agree with the current assessment done in the ED  Chief Complaint   Patient presents with    Altered Mental Status     per EMS [facility] was concerned that pt fall yesterday and today he has increased confusion, fever, and congestion  This is an [de-identified]year old male with dementia at [facility] who for the past 2 weeks has been having recurrent falls in setting with head bleeds  He was found on the ground + knees yesterday (unwitnessed, normally wheel chair bound)  Today ntoed that his pupil was assymetric / anisicoric so sent him in  No other acute neuro deficits  Patient offers no complaints, but very demented  ROS not reliable  EMS: reported possible fever, 100F  No meds given  99 2F rectal here  PCP wants basic labs and CTH  PMH:  - HTN  - Hematuria  - GERD  - Dementia  - Prostate CA  - Cancer  - Ehrlichiosis  PSH:  - Hip arthroplasty  - Shoulder surgery  - Bladder suspension  No smoking, drinking, drugs  PE:  Vitals:    03/08/18 1500 03/08/18 1559 03/08/18 1700 03/08/18 1730   BP: 119/64 120/63 120/56 152/79   BP Location:  Right arm Right arm Right arm   Pulse: 72 72 74 80   Resp: 18 18 22 22   Temp:       TempSrc:       SpO2: 96% 97% 98% 98%   Weight:       General: VSS, NAD, awake, alert  Well-nourished, well-developed  Appears stated age  Speaking normally in full sentences  Head: Normocephalic, atraumatic, nontender  Eyes: PERRL, EOM-I  No diplopia  No hyphema  No subconjunctival hemorrhages  Symmetrical lids  ENT: Atraumatic external nose and ears  MMM  No malocclusion   No stridor  Normal phonation  No drooling  Normal swallowing  Neck: Symmetric, trachea midline  No JVD  CV: RRR  +S1/S2  No murmurs or gallops  Peripheral pulses +2 throughout  No chest wall tenderness  Lungs:   Unlabored No retractions  CTAB, lungs sounds equal bilateral    No tachypnea  Abd: +BS, soft, NT/ND    MSK:   FROM   Back:   No rashes  Skin: Dry, intact  Neuro: AAOx3, GCS 15, CN II-XII grossly intact  Motor grossly intact  Psychiatric/Behavioral: Appropriate mood and affect   Exam: deferred  A:  - Fall?  - AMS? P:  - Urine for infection  - CTH for NPH + bleed  - Basic labs as requested by PCP for electrolyte abnormalities  - Likely DC back to facility if negative workup  - 13 point ROS was performed and all are normal unless stated in the history above  - Nursing note reviewed  Vitals reviewed  - Orders placed by myself and/or advanced practitioner / resident     - Previous chart was reviewed  - No language barrier    - History obtained from EMS, nursing home, patient  - There are limitations to the history obtained  Reasons ROS could not be obtained: dementia  - Critical care time: Not applicable for this patient  Final Diagnosis:  1  Multiple falls      ED Course as of Mar 13 2205   Thu Mar 08, 2018   1615 Creatinine: 0 93   1615 BUN: 23   1615 WBC: 9 56   1615 Hemoglobin: 14 0     Medications - No data to display  CT spine cervical without contrast   ED Interpretation   IMPRESSION:       No cervical spine fracture or traumatic malalignment  Final Result      No cervical spine fracture or traumatic malalignment  Workstation performed: [id number]         CT head without contrast   ED Interpretation   IMPRESSION:       No acute intracranial abnormality  Microangiopathic changes  Final Result      No acute intracranial abnormality  Microangiopathic changes                    Workstation performed: [id number]           Orders Placed This Encounter Procedures    CT head without contrast    CT spine cervical without contrast    CBC and differential    Basic metabolic panel    Urine Microscopic     Labs Reviewed   URINE MICROSCOPIC - Abnormal        Result Value Ref Range Status    RBC, UA 20-30 (*) None Seen, 0-5 /hpf Final    WBC, UA None Seen  None Seen, 0-5, 5-55, 5-65 /hpf Final    Epithelial Cells None Seen  None Seen, Occasional /hpf Final    Bacteria, UA None Seen  None Seen, Occasional /hpf Final    Hyaline Casts, UA None Seen  None Seen /lpf Final   ED URINE MACROSCOPIC - Abnormal     Color, UA Yellow   Final    Clarity, UA Clear   Final    pH, UA 7 5  4 5 - 8 0 Final    Leukocytes, UA Negative  Negative Final    Nitrite, UA Negative  Negative Final    Protein, UA 30 (1+) (*) Negative mg/dl Final    Glucose, UA Negative  Negative mg/dl Final    Ketones, UA Negative  Negative mg/dl Final    Urobilinogen, UA 2 0 (*) 0 2, 1 0 E U /dl E U /dl Final    Bilirubin, UA Negative  Negative Final    Blood, UA Small (*) Negative Final    Specific Gravity, UA 1 020  1 003 - 1 030 Final    Narrative:     CLINITEK RESULT   CBC AND DIFFERENTIAL - Normal    WBC 9 56  4 31 - 10 16 Thousand/uL Final    RBC 4 36  3 88 - 5 62 Million/uL Final    Hemoglobin 14 0  12 0 - 17 0 g/dL Final    Hematocrit 40 4  36 5 - 49 3 % Final    MCV 93  82 - 98 fL Final    MCH 32 1  26 8 - 34 3 pg Final    MCHC 34 7  31 4 - 37 4 g/dL Final    RDW 12 7  11 6 - 15 1 % Final    MPV 9 7  8 9 - 12 7 fL Final    Platelets 575  342 - 390 Thousands/uL Final    nRBC 0  /100 WBCs Final    Neutrophils Relative 72  43 - 75 % Final    Lymphocytes Relative 15  14 - 44 % Final    Monocytes Relative 10  4 - 12 % Final    Eosinophils Relative 3  0 - 6 % Final    Basophils Relative 0  0 - 1 % Final    Neutrophils Absolute 6 81  1 85 - 7 62 Thousands/µL Final    Lymphocytes Absolute 1 46  0 60 - 4 47 Thousands/µL Final    Monocytes Absolute 0 92  0 17 - 1 22 Thousand/µL Final    Eosinophils Absolute 0  32  0 00 - 0 61 Thousand/µL Final    Basophils Absolute 0 02  0 00 - 0 10 Thousands/µL Final   BASIC METABOLIC PANEL    Sodium 130  136 - 145 mmol/L Final    Potassium 4 0  3 5 - 5 3 mmol/L Final    Chloride 100  100 - 108 mmol/L Final    CO2 32  21 - 32 mmol/L Final    Anion Gap 5  4 - 13 mmol/L Final    BUN 23  5 - 25 mg/dL Final    Creatinine 0 93  0 60 - 1 30 mg/dL Final    Comment: Standardized to IDMS reference method    Glucose 102  65 - 140 mg/dL Final    Comment:   If the patient is fasting, the ADA then defines impaired fasting glucose as > 100 mg/dL and diabetes as > or equal to 123 mg/dL  Specimen collection should occur prior to Sulfasalazine administration due to the potential for falsely depressed results  Specimen collection should occur prior to Sulfapyridine administration due to the potential for falsely elevated results  Calcium 8 4  8 3 - 10 1 mg/dL Final    eGFR 77  ml/min/1 73sq m Final    Narrative:     National Kidney Disease Education Program recommendations are as follows:  GFR calculation is accurate only with a steady state creatinine  Chronic Kidney disease less than 60 ml/min/1 73 sq  meters  Kidney failure less than 15 ml/min/1 73 sq  meters  Time reflects when diagnosis was documented in both MDM as applicable and the Disposition within this note     Time User Action Codes Description Comment    3/8/2018  5:06 PM Talia Gr Add [R29 6] Multiple falls       ED Disposition     ED Disposition Condition Comment    Discharge  Elias Solorio discharge to home/self care      Condition at discharge: Stable        Follow-up Information     Follow up With Specialties Details Why [address]  Internal Medicine   [place] [address]  [address]  [phone]          Return to ED if any new/worsening symptoms        Discharge Medication List as of 3/8/2018  5:07 PM      CONTINUE these medications which have NOT CHANGED    Details acetaminophen (TYLENOL) 325 mg tablet 650 mg every 6 hours for mild pain, Print      esomeprazole (NexIUM) 40 MG capsule Take 40 mg by mouth every morning before breakfast, Historical Med      gabapentin (NEURONTIN) 100 mg capsule Take 1 capsule by mouth daily at bedtime, Starting Fri 10/27/2017, Print      lisinopril-hydrochlorothiazide (PRINZIDE,ZESTORETIC) 20-12 5 MG per tablet Take 1 tablet by mouth daily, Historical Med      memantine (NAMENDA) 10 mg tablet Take 10 mg by mouth 2 (two) times a day, Historical Med      polyethylene glycol (MIRALAX) 17 g packet Take 17 g by mouth daily, Starting Sat 10/28/2017, Print      pravastatin (PRAVACHOL) 40 mg tablet Take 40 mg by mouth daily, Historical Med      senna-docusate sodium (SENOKOT S) 8 6-50 mg per tablet Take 1 tablet by mouth daily at bedtime, Starting Fri 10/27/2017, No Print           No discharge procedures on file  Prior to Admission Medications   Prescriptions Last Dose Informant Patient Reported? Taking?   acetaminophen (TYLENOL) 325 mg tablet   No Yes   Si mg every 6 hours for mild pain   esomeprazole (NexIUM) 40 MG capsule   Yes Yes   Sig: Take 40 mg by mouth every morning before breakfast   gabapentin (NEURONTIN) 100 mg capsule   No Yes   Sig: Take 1 capsule by mouth daily at bedtime   lisinopril-hydrochlorothiazide (PRINZIDE,ZESTORETIC) 20-12 5 MG per tablet   Yes Yes   Sig: Take 1 tablet by mouth daily   memantine (NAMENDA) 10 mg tablet   Yes Yes   Sig: Take 10 mg by mouth 2 (two) times a day   polyethylene glycol (MIRALAX) 17 g packet   No Yes   Sig: Take 17 g by mouth daily   pravastatin (PRAVACHOL) 40 mg tablet   Yes Yes   Sig: Take 40 mg by mouth daily   senna-docusate sodium (SENOKOT S) 8 6-50 mg per tablet   No Yes   Sig: Take 1 tablet by mouth daily at bedtime      Facility-Administered Medications: None       Portions of the record may have been created with voice recognition software   Occasional wrong word or "sound a like" substitutions may have occurred due to the inherent limitations of voice recognition software  Read the chart carefully and recognize, using context, where substitutions have occurred      Electronically signed by:  Corinne Mccormack

## 2018-03-08 NOTE — ED PROVIDER NOTES
History  Chief Complaint   Patient presents with    Altered Mental Status     per EMS [facility] was concerned that pt fall yesterday and today he has increased confusion, fever, and congestion  53-year-old male baseline dementia presenting from nursing home after a fall  Patient is unable to provide history given his baseline dementia  He is only oriented to himself, which is his baseline  He offers no complaints  Per nursing home staff, patient has had recurrent falls over the past few weeks  Yesterday, he was found on the ground on his knees, they are unsure if he struck his head  Patient had a prior intracranial hemorrhage which is what placed him into the facility in the 1st place back in October  He is wheelchair-bound at this time  Staff also report the patient had a temperature of 100° F earlier today, no antipyretics were given prior to arrival, temperature is 99 2° rectally here  There are no external signs of trauma on exam   Patient appears to be at his baseline mental status  A/P:  53-year-old male with recurrent falls, will get CTH to rule out intracranial bleed, CT C-spine to rule out fracture, CBC to assess for anemia, BMP to assess electrolytes/renal function, UA to rule UTI            Prior to Admission Medications   Prescriptions Last Dose Informant Patient Reported?  Taking?   acetaminophen (TYLENOL) 325 mg tablet   No Yes   Si mg every 6 hours for mild pain   esomeprazole (NexIUM) 40 MG capsule   Yes Yes   Sig: Take 40 mg by mouth every morning before breakfast   gabapentin (NEURONTIN) 100 mg capsule   No Yes   Sig: Take 1 capsule by mouth daily at bedtime   lisinopril-hydrochlorothiazide (PRINZIDE,ZESTORETIC) 20-12 5 MG per tablet   Yes Yes   Sig: Take 1 tablet by mouth daily   memantine (NAMENDA) 10 mg tablet   Yes Yes   Sig: Take 10 mg by mouth 2 (two) times a day   polyethylene glycol (MIRALAX) 17 g packet   No Yes   Sig: Take 17 g by mouth daily   pravastatin (PRAVACHOL) 40 mg tablet   Yes Yes   Sig: Take 40 mg by mouth daily   senna-docusate sodium (SENOKOT S) 8 6-50 mg per tablet   No Yes   Sig: Take 1 tablet by mouth daily at bedtime      Facility-Administered Medications: None       Past Medical History:   Diagnosis Date    [facility] ([facility] [address] )     prostate    Dementia     Ehrlichiosis     GERD (gastroesophageal reflux disease)     Hematuria     Hypertension     Lyme disease     Neoplasm of prostate        Past Surgical History:   Procedure Laterality Date    BLADDER SUSPENSION      HIP ARTHROPLASTY      SHOULDER SURGERY Right        History reviewed  No pertinent family history  I have reviewed and agree with the history as documented  Social History   Substance Use Topics    Smoking status: Never Smoker    Smokeless tobacco: Never Used    Alcohol use No        Review of Systems   Unable to perform ROS: Dementia       Physical Exam  ED Triage Vitals [03/08/18 1434]   Temperature Pulse Respirations Blood Pressure SpO2   97 9 °F (36 6 °C) 71 18 105/57 97 %      Temp Source Heart Rate Source Patient Position - Orthostatic VS BP Location FiO2 (%)   Oral Monitor Lying Right arm --      Pain Score       No Pain           Orthostatic Vital Signs  Vitals:    03/08/18 1500 03/08/18 1559 03/08/18 1700 03/08/18 1730   BP: 119/64 120/63 120/56 152/79   Pulse: 72 72 74 80   Patient Position - Orthostatic VS:  Lying Lying Lying       Physical Exam   Constitutional: He appears well-developed and well-nourished  No external signs of trauma   HENT:   Head: Normocephalic and atraumatic  Nose: Nose normal    Mouth/Throat: Oropharynx is clear and moist    Eyes: Conjunctivae and EOM are normal    Neck: Normal range of motion  Neck supple  No midline C/T/L spine ttp   Cardiovascular: Normal rate, regular rhythm and normal heart sounds  Pulmonary/Chest: Effort normal and breath sounds normal  No respiratory distress  He has no wheezes  He exhibits no tenderness  Abdominal: Soft  He exhibits no distension  There is no tenderness  Musculoskeletal: He exhibits no edema or deformity  Neurological: He is alert  He exhibits normal muscle tone  Coordination normal    Oriented to person, not place/time  No focal neuro deficits  Strength 5/5 throughout  Normal coordination   Skin: Skin is warm and dry  No rash noted  Psychiatric: He has a normal mood and affect  Thought content normal    Nursing note and vitals reviewed        ED Medications  Medications - No data to display    Diagnostic Studies  Results Reviewed     Procedure Component Value Units Date/Time    Urine Microscopic [[id number]]  (Abnormal) Collected:  03/08/18 1711    Lab Status:  Final result Specimen:  Urine from Urine, Other Updated:  03/08/18 1732     RBC, UA 20-30 (A) /hpf      WBC, UA None Seen /hpf      Epithelial Cells None Seen /hpf      Bacteria, UA None Seen /hpf      Hyaline Casts, UA None Seen /lpf     ED Urine Macroscopic [[id number]]  (Abnormal) Collected:  03/08/18 1711    Lab Status:  Final result Specimen:  Urine Updated:  03/08/18 1704     Color, UA Yellow     Clarity, UA Clear     pH, UA 7 5     Leukocytes, UA Negative     Nitrite, UA Negative     Protein, UA 30 (1+) (A) mg/dl      Glucose, UA Negative mg/dl      Ketones, UA Negative mg/dl      Urobilinogen, UA 2 0 (A) E U /dl      Bilirubin, UA Negative     Blood, UA Small (A)     Specific [place], UA 1 020    Narrative:       CLINITEK RESULT    Basic metabolic panel [[id number]] Collected:  03/08/18 1521    Lab Status:  Final result Specimen:  Blood from Arm, Left Updated:  03/08/18 1550     Sodium 137 mmol/L      Potassium 4 0 mmol/L      Chloride 100 mmol/L      CO2 32 mmol/L      Anion Gap 5 mmol/L      BUN 23 mg/dL      Creatinine 0 93 mg/dL      Glucose 102 mg/dL      Calcium 8 4 mg/dL      eGFR 77 ml/min/1 73sq m     Narrative:         National Kidney Disease Education Program recommendations are as follows:  GFR calculation is accurate only with a steady state creatinine  Chronic Kidney disease less than 60 ml/min/1 73 sq  meters  Kidney failure less than 15 ml/min/1 73 sq  meters  CBC and differential [[id number]]  (Normal) Collected:  03/08/18 1521    Lab Status:  Final result Specimen:  Blood from Arm, Left Updated:  03/08/18 1535     WBC 9 56 Thousand/uL      RBC 4 36 Million/uL      Hemoglobin 14 0 g/dL      Hematocrit 40 4 %      MCV 93 fL      MCH 32 1 pg      MCHC 34 7 g/dL      RDW 12 7 %      MPV 9 7 fL      Platelets 943 Thousands/uL      nRBC 0 /100 WBCs      Neutrophils Relative 72 %      Lymphocytes Relative 15 %      Monocytes Relative 10 %      Eosinophils Relative 3 %      Basophils Relative 0 %      Neutrophils Absolute 6 81 Thousands/µL      Lymphocytes Absolute 1 46 Thousands/µL      Monocytes Absolute 0 92 Thousand/µL      Eosinophils Absolute 0 32 Thousand/µL      Basophils Absolute 0 02 Thousands/µL                  CT spine cervical without contrast   ED Interpretation by Racheal Littlejohn DO (03/08 1610)   IMPRESSION:       No cervical spine fracture or traumatic malalignment  Final Result by Enis Boxer, MD (03/08 1604)      No cervical spine fracture or traumatic malalignment  Workstation performed: [id number]         CT head without contrast   ED Interpretation by Racheal Littlejohn DO (03/08 1610)   IMPRESSION:       No acute intracranial abnormality  Microangiopathic changes  Final Result by Enis Boxer, MD (03/08 3482)      No acute intracranial abnormality  Microangiopathic changes  Workstation performed: [id number]               Procedures  Procedures      Phone Consults  ED Phone Contact    ED Course  ED Course as of Mar 08 2106   Thu Mar 08, 2018   1448 Rectal T 99 2    1448 Spoke with patient's PCP at the facility- she reports that the pt has been having recurrent falls over past few weeks  Found on his knees yesterday, unwitnessed  Wheelchair at baseline   Temperature of 100F today, no antipyretics were given  She requests basic labs drawn because if they ordered at the facility they will not result until tomorrow            Identification of Seniors at [facility] Most Recent Value   (ISAR) Identification of Seniors at Risk   Before the illness or injury that brought you to the Emergency, did you need someone to help you on a regular basis? 1 Filed at: 03/08/2018 1429   In the last 24 hours, have you needed more help than usual?  1 Filed at: 03/08/2018 1429   Have you been hospitalized for one or more nights during the past 6 months? 1 Filed at: 03/08/2018 1429   In general, do you see well? 1 Filed at: 03/08/2018 1429   In general, do you have serious problems with your memory? 1 Filed at: 03/08/2018 1429   Do you take more than three different medications every day? 1 Filed at: 03/08/2018 1429   ISAR Score  6 Filed at: 03/08/2018 1429                          [facility]  Number of Diagnoses or Management Options  Multiple falls:   Diagnosis management comments: [de-identified] yo M with recurrent falls, no traumatic injuries found  UA/labs unremarkable  Discharged back to NH       Amount and/or Complexity of Data Reviewed  Clinical lab tests: ordered and reviewed  Tests in the radiology section of CPT®: ordered and reviewed  Tests in the medicine section of CPT®: ordered and reviewed      CritCare Time    Disposition  Final diagnoses:   Multiple falls     Time reflects when diagnosis was documented in both MDM as applicable and the Disposition within this note     Time User Action Codes Description Comment    3/8/2018  5:06 PM Amparo Yost Add [R29 6] Multiple falls       ED Disposition     ED Disposition Condition Comment    Discharge  Inetta Fallen discharge to home/self care      Condition at discharge: Stable        Follow-up Information     Follow up With Specialties Details Why [address]  Internal Medicine   [address]  [address] [place] [id number]  [phone]          Return to ED if any new/worsening symptoms        Discharge Medication List as of 3/8/2018  5:07 PM      CONTINUE these medications which have NOT CHANGED    Details   acetaminophen (TYLENOL) 325 mg tablet 650 mg every 6 hours for mild pain, Print      esomeprazole (NexIUM) 40 MG capsule Take 40 mg by mouth every morning before breakfast, Historical Med      gabapentin (NEURONTIN) 100 mg capsule Take 1 capsule by mouth daily at bedtime, Starting Fri 10/27/2017, Print      lisinopril-hydrochlorothiazide (PRINZIDE,ZESTORETIC) 20-12 5 MG per tablet Take 1 tablet by mouth daily, Historical Med      memantine (NAMENDA) 10 mg tablet Take 10 mg by mouth 2 (two) times a day, Historical Med      polyethylene glycol (MIRALAX) 17 g packet Take 17 g by mouth daily, Starting Sat 10/28/2017, Print      pravastatin (PRAVACHOL) 40 mg tablet Take 40 mg by mouth daily, Historical Med      senna-docusate sodium (SENOKOT S) 8 6-50 mg per tablet Take 1 tablet by mouth daily at bedtime, Starting Fri 10/27/2017, No Print           No discharge procedures on file  ED Provider  Attending physically available and evaluated Jonah Diehl I managed the patient along with the ED Attending      Electronically Signed by         Eids Bañuelos DO  03/08/18 1217

## 2018-03-26 ENCOUNTER — OFFICE VISIT (OUTPATIENT)
Dept: GERIATRICS | Facility: OTHER | Age: 83
End: 2018-03-26
Payer: MEDICARE

## 2018-03-26 VITALS
DIASTOLIC BLOOD PRESSURE: 60 MMHG | RESPIRATION RATE: 18 BRPM | SYSTOLIC BLOOD PRESSURE: 120 MMHG | TEMPERATURE: 97.8 F | HEART RATE: 78 BPM

## 2018-03-26 DIAGNOSIS — I61.5 INTRAVENTRICULAR HEMORRHAGE (HCC): Primary | ICD-10-CM

## 2018-03-26 DIAGNOSIS — E78.2 MIXED HYPERLIPIDEMIA: ICD-10-CM

## 2018-03-26 DIAGNOSIS — I10 ESSENTIAL HYPERTENSION: ICD-10-CM
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
## 2018-03-26 DIAGNOSIS — F02.80 DEMENTIA ASSOCIATED WITH OTHER UNDERLYING DISEASE WITHOUT BEHAVIORAL DISTURBANCE (HCC): ICD-10-CM

## 2018-03-26 PROBLEM — A69.20 LYME DISEASE: Status: ACTIVE | Noted: 2018-03-26

## 2018-03-26 PROBLEM — C61 PROSTATE CANCER (HCC): Status: ACTIVE | Noted: 2018-03-26

## 2018-03-26 PROCEDURE — 99215 OFFICE O/P EST HI 40 MIN: CPT | Performed by: INTERNAL MEDICINE

## 2018-03-26 NOTE — ASSESSMENT & PLAN NOTE
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

## 2018-03-26 NOTE — PROGRESS NOTES
Assessment/Plan:    HTN (hypertension)  Hypertension - Blood pressure controlled with current medications  Recommend continue current medications  Monitor electrolytes and renal function  Dementia  Next etiology  Continue long-term care, 24 hour care  Currently takes Namenda and will continue that medication at this time  Fall from standing  Multifactorial   Continue 24 hour care  Long-term care  Currently in a wheelchair  Programs in place to reduce fall risk  HLD (hyperlipidemia)  Continue statin  Monitor LFTs and lipid levels  Intraventricular hemorrhage (HCC)  Has contributed to overall brain injury  Bleed was in the front left area and left intraventricular side of the brain  Has resolved however now has chronic total brain injury and next dementia  Continue long-term care  Subjective: Follow-up multiple chronic illnesses     Patient ID: Memo Sherwood is a [de-identified] y o  male  Patient seen examined for routine office visit  Presents with his son  Son is present for interview and examination  Patient overall appears at baseline  Appears in a good mood  Patient's history reviewed at length with his son  Review of Systems   Neurological: Positive for speech difficulty  Psychiatric/Behavioral: Positive for confusion  Objective:      /60 (BP Location: Left arm, Patient Position: Sitting, Cuff Size: Standard)   Pulse 78   Temp 97 8 °F (36 6 °C) (Tympanic)   Resp 18          Physical Exam   Constitutional: He appears well-developed  HENT:   Head: Normocephalic and atraumatic  Right Ear: External ear normal    Left Ear: External ear normal    Nose: Nose normal    Mouth/Throat: Oropharynx is clear and moist    Eyes: Conjunctivae and EOM are normal  Pupils are equal, round, and reactive to light  Neck: Normal range of motion  Cardiovascular: Normal rate, regular rhythm, normal heart sounds and intact distal pulses      Pulmonary/Chest: Effort normal and breath sounds normal    Abdominal: Soft  Bowel sounds are normal    Musculoskeletal: Normal range of motion  Neurological: He is alert  Left-sided hemipareses  Decrease range of motion right hand  Skin: Skin is warm and dry  Psychiatric: He has a normal mood and affect  His behavior is normal      Time spent 45 minutes with 25 minutes to coordination of care/counseling with patient/staff

## 2018-03-26 NOTE — ASSESSMENT & PLAN NOTE
Multifactorial   Continue 24 hour care  Long-term care  Currently in a wheelchair  Programs in place to reduce fall risk

## 2018-03-26 NOTE — ASSESSMENT & PLAN NOTE
Hypertension - Blood pressure controlled with current medications  Recommend continue current medications  Monitor electrolytes and renal function

## 2018-03-26 NOTE — ASSESSMENT & PLAN NOTE
Next etiology  Continue long-term care, 24 hour care  Currently takes Namenda and will continue that medication at this time

## 2018-12-07 ENCOUNTER — OFFICE VISIT (OUTPATIENT)
Dept: GERIATRICS | Facility: OTHER | Age: 83
End: 2018-12-07
Payer: MEDICARE

## 2018-12-07 ENCOUNTER — DOCUMENTATION (OUTPATIENT)
Dept: GERIATRICS | Facility: OTHER | Age: 83
End: 2018-12-07

## 2018-12-07 VITALS
RESPIRATION RATE: 16 BRPM | TEMPERATURE: 97.8 F | SYSTOLIC BLOOD PRESSURE: 138 MMHG | HEART RATE: 62 BPM | DIASTOLIC BLOOD PRESSURE: 80 MMHG

## 2018-12-07 DIAGNOSIS — H61.23 HEARING LOSS DUE TO CERUMEN IMPACTION, BILATERAL: Primary | ICD-10-CM

## 2018-12-07 PROCEDURE — 69209 REMOVE IMPACTED EAR WAX UNI: CPT | Performed by: NURSE PRACTITIONER

## 2018-12-07 NOTE — PROGRESS NOTES
Ear cerumen removal  Date/Time: 12/7/2018 9:23 AM  Performed by: Allan Lehman by: Mike Gonzalez     Patient location:  Clinic  Indications / Diagnosis:  Hearing loss due to bilateral cerumen impaction  Other Assisting Provider: No    Consent:     Consent obtained:  Verbal    Consent given by:  Healthcare agent    Risks discussed:  Bleeding, dizziness, incomplete removal, infection, pain and TM perforation    Alternatives discussed:  No treatment  Universal protocol:     Procedure explained and questions answered to patient or proxy's satisfaction: yes      Patient identity confirmed:  Verbally with patient  Procedure details:     Location:  L ear and R ear    Procedure type: irrigation      Equipment used:  Ear irrigation syringe, curette  Post-procedure details:     Complication:  None    Hearing quality:  Normal (Unable to determine, )    Patient tolerance of procedure:  Procedure terminated at patient's request  Comments:      Patient could not tolerate procedure however a large amount of cerumen was removed from his bilateral ears and tympanic membrane visualized

## 2019-02-22 ENCOUNTER — OFFICE VISIT (OUTPATIENT)
Dept: GERIATRICS | Facility: OTHER | Age: 84
End: 2019-02-22
Payer: MEDICARE

## 2019-02-22 VITALS
DIASTOLIC BLOOD PRESSURE: 70 MMHG | RESPIRATION RATE: 18 BRPM | HEART RATE: 80 BPM | TEMPERATURE: 97.5 F | SYSTOLIC BLOOD PRESSURE: 130 MMHG

## 2019-02-22 DIAGNOSIS — H61.23 BILATERAL HEARING LOSS DUE TO CERUMEN IMPACTION: Primary | ICD-10-CM

## 2019-02-22 PROCEDURE — 69210 REMOVE IMPACTED EAR WAX UNI: CPT | Performed by: NURSE PRACTITIONER

## 2019-02-22 NOTE — PROGRESS NOTES
Ear cerumen removal  Date/Time: 2/22/2019 10:40 AM  Performed by: Primitivo Cheadle, CRNP  Authorized by: Primitivo Cheadle, CRNP     Patient location:  Clinic  Indications / Diagnosis:  Hearing loss due to bilateral cerumen impaction  Other Assisting Provider: No    Consent:     Consent obtained:  Verbal    Consent given by:  Healthcare agent    Risks discussed:  Bleeding, infection, pain, incomplete removal, dizziness and TM perforation    Alternatives discussed:  No treatment  Universal protocol:     Procedure explained and questions answered to patient or proxy's satisfaction: yes      Patient identity confirmed:  Verbally with patient  Procedure details:     Local anesthetic:  None    Location:  L ear and R ear    Procedure type: irrigation      Approach:  Natural orifice endoscopic and external    Equipment used:  Ear irrigation syringe, curette  Post-procedure details:     Complication:  None    Hearing quality:  Improved    Patient tolerance of procedure: Tolerated well, no immediate complications  Comments:      1ml of colace inserted in bilateral ears to soften cerumen

## 2019-05-30 ENCOUNTER — NURSING HOME VISIT (OUTPATIENT)
Dept: GERIATRICS | Facility: OTHER | Age: 84
End: 2019-05-30
Payer: MEDICARE

## 2019-05-30 DIAGNOSIS — K59.09 CHRONIC CONSTIPATION: ICD-10-CM

## 2019-05-30 DIAGNOSIS — F02.80 DEMENTIA ASSOCIATED WITH OTHER UNDERLYING DISEASE WITHOUT BEHAVIORAL DISTURBANCE (HCC): Primary | ICD-10-CM

## 2019-05-30 DIAGNOSIS — K21.9 GASTROESOPHAGEAL REFLUX DISEASE WITHOUT ESOPHAGITIS: ICD-10-CM

## 2019-05-30 DIAGNOSIS — E43 SEVERE PROTEIN-CALORIE MALNUTRITION (HCC): ICD-10-CM

## 2019-05-30 DIAGNOSIS — J18.9 PNEUMONIA DUE TO INFECTIOUS ORGANISM, UNSPECIFIED LATERALITY, UNSPECIFIED PART OF LUNG: ICD-10-CM

## 2019-05-30 DIAGNOSIS — G89.29 OTHER CHRONIC PAIN: ICD-10-CM

## 2019-05-30 DIAGNOSIS — I10 ESSENTIAL HYPERTENSION: ICD-10-CM

## 2019-05-30 DIAGNOSIS — R13.10 DYSPHAGIA, UNSPECIFIED TYPE: ICD-10-CM

## 2019-05-30 DIAGNOSIS — R53.81 PHYSICAL DECONDITIONING: ICD-10-CM

## 2019-05-30 PROCEDURE — 99309 SBSQ NF CARE MODERATE MDM 30: CPT | Performed by: FAMILY MEDICINE

## 2019-05-31 PROBLEM — G89.29 CHRONIC PAIN: Status: ACTIVE | Noted: 2019-05-31

## 2019-05-31 PROBLEM — R13.10 DYSPHAGIA: Status: ACTIVE | Noted: 2019-05-31

## 2019-05-31 PROBLEM — R41.0 DELIRIUM: Status: RESOLVED | Noted: 2017-10-24 | Resolved: 2019-05-31

## 2019-05-31 PROBLEM — S00.03XA SCALP HEMATOMA: Status: RESOLVED | Noted: 2017-10-23 | Resolved: 2019-05-31

## 2019-05-31 PROBLEM — H61.23 HEARING LOSS DUE TO CERUMEN IMPACTION, BILATERAL: Status: RESOLVED | Noted: 2018-12-07 | Resolved: 2019-05-31

## 2019-05-31 PROBLEM — K59.09 CHRONIC CONSTIPATION: Status: ACTIVE | Noted: 2019-05-31

## 2019-05-31 PROBLEM — I61.5 INTRAVENTRICULAR HEMORRHAGE (HCC): Status: RESOLVED | Noted: 2017-10-23 | Resolved: 2019-05-31

## 2019-05-31 PROBLEM — K21.9 GERD (GASTROESOPHAGEAL REFLUX DISEASE): Status: ACTIVE | Noted: 2019-05-31

## 2019-05-31 PROBLEM — A69.20 LYME DISEASE: Status: RESOLVED | Noted: 2018-03-26 | Resolved: 2019-05-31

## 2019-05-31 PROBLEM — J18.9 PNEUMONIA: Status: ACTIVE | Noted: 2019-05-31

## 2019-06-18 ENCOUNTER — NURSING HOME VISIT (OUTPATIENT)
Dept: GERIATRICS | Facility: OTHER | Age: 84
End: 2019-06-18
Payer: MEDICARE

## 2019-06-18 DIAGNOSIS — R53.81 PHYSICAL DECONDITIONING: ICD-10-CM

## 2019-06-18 DIAGNOSIS — R31.9 HEMATURIA, UNSPECIFIED TYPE: ICD-10-CM

## 2019-06-18 DIAGNOSIS — R05.9 COUGH: Primary | ICD-10-CM

## 2019-06-18 PROCEDURE — 99308 SBSQ NF CARE LOW MDM 20: CPT | Performed by: NURSE PRACTITIONER

## 2019-06-20 ENCOUNTER — NURSING HOME VISIT (OUTPATIENT)
Dept: GERIATRICS | Facility: OTHER | Age: 84
End: 2019-06-20
Payer: MEDICARE

## 2019-06-20 DIAGNOSIS — R50.9 FEVER, UNSPECIFIED FEVER CAUSE: ICD-10-CM

## 2019-06-20 DIAGNOSIS — F02.80 DEMENTIA ASSOCIATED WITH OTHER UNDERLYING DISEASE WITHOUT BEHAVIORAL DISTURBANCE (HCC): ICD-10-CM

## 2019-06-20 DIAGNOSIS — J40 BRONCHITIS: Primary | ICD-10-CM

## 2019-06-20 PROCEDURE — 99308 SBSQ NF CARE LOW MDM 20: CPT | Performed by: NURSE PRACTITIONER

## 2019-06-23 PROBLEM — R05.9 COUGH: Status: ACTIVE | Noted: 2019-06-18

## 2019-06-23 PROBLEM — R31.9 HEMATURIA: Status: ACTIVE | Noted: 2019-06-18

## 2019-06-29 PROBLEM — R50.9 FEVER: Status: ACTIVE | Noted: 2019-06-20

## 2019-06-29 PROBLEM — J40 BRONCHITIS: Status: ACTIVE | Noted: 2019-06-20

## 2019-07-02 ENCOUNTER — NURSING HOME VISIT (OUTPATIENT)
Dept: GERIATRICS | Facility: OTHER | Age: 84
End: 2019-07-02
Payer: MEDICARE

## 2019-07-02 DIAGNOSIS — K21.9 GASTROESOPHAGEAL REFLUX DISEASE WITHOUT ESOPHAGITIS: ICD-10-CM

## 2019-07-02 DIAGNOSIS — K59.09 CHRONIC CONSTIPATION: ICD-10-CM

## 2019-07-02 DIAGNOSIS — R26.2 AMBULATORY DYSFUNCTION: ICD-10-CM

## 2019-07-02 DIAGNOSIS — R13.10 DYSPHAGIA, UNSPECIFIED TYPE: ICD-10-CM

## 2019-07-02 DIAGNOSIS — R53.81 PHYSICAL DECONDITIONING: ICD-10-CM

## 2019-07-02 DIAGNOSIS — F02.80 DEMENTIA ASSOCIATED WITH OTHER UNDERLYING DISEASE WITHOUT BEHAVIORAL DISTURBANCE (HCC): Primary | ICD-10-CM

## 2019-07-02 DIAGNOSIS — I10 ESSENTIAL HYPERTENSION: ICD-10-CM

## 2019-07-02 DIAGNOSIS — R05.9 COUGH: ICD-10-CM

## 2019-07-02 DIAGNOSIS — J40 BRONCHITIS: ICD-10-CM

## 2019-07-02 PROCEDURE — 99309 SBSQ NF CARE MODERATE MDM 30: CPT | Performed by: NURSE PRACTITIONER

## 2019-07-02 NOTE — PROGRESS NOTES
[address]  [facility]  [phone]  [facility]   Progress Note  POS 32        NAME: Harris Arroyo  AGE: 82 y o  SEX: male  :  1935  DATE OF ENCOUNTER: 2019    Chief Complaint   Patient seen and examined for follow up on chronic conditions  History of Present Illness     Amber Steele is a 82year old male seen and examined today to update the care and treatment plan for acute and chronic medical conditions  He has a history of advanced dementia and is under hospice care  He is a poor historian, unable to provide a history  He is s/p antibiotic treatment for acute bronchitis a couple of weeks ago with Azithromycin with improvement in his signs and symptoms, however he still presents with a chronic cough and increased secretions  He is sitting in his wheelchair, calm , cooperative and in no acute distress  Nursing reports a continued decline with occasional intermittent behaviors most likely related to advanced dementia  The following portions of the patient's history were reviewed and updated as appropriate: allergies, current medications, past family history, past medical history, past social history, past surgical history and problem list     Review of Systems     Unable to obtain related to advanced dementia    History     Past Medical History:   Diagnosis Date    Cancer [facility])     prostate    Dementia     Ehrlichiosis     GERD (gastroesophageal reflux disease)     Hematuria     Hypertension     Lyme disease     Neoplasm of prostate      Past Surgical History:   Procedure Laterality Date    BLADDER SUSPENSION      HIP ARTHROPLASTY      SHOULDER SURGERY Right      No family history on file    Social History     Socioeconomic History    Marital status:      Spouse name: Not on file    Number of children: Not on file    Years of education: Not on file    Highest education level: Not on file   Occupational History    Not on file   Social Needs    Financial resource strain: Not on file    Food insecurity:     Worry: Not on file     Inability: Not on file    Transportation needs:     Medical: Not on file     Non-medical: Not on file   Tobacco Use    Smoking status: Never Smoker    Smokeless tobacco: Never Used   Substance and Sexual Activity    Alcohol use: No    Drug use: No    Sexual activity: Not on file   Lifestyle    Physical activity:     Days per week: Not on file     Minutes per session: Not on file    Stress: Not on file   Relationships    Social connections:     Talks on phone: Not on file     Gets together: Not on file     Attends Jain service: Not on file     Active member of club or organization: Not on file     Attends meetings of clubs or organizations: Not on file     Relationship status: Not on file    Intimate partner violence:     Fear of current or ex partner: Not on file     Emotionally abused: Not on file     Physically abused: Not on file     Forced sexual activity: Not on file   Other Topics Concern    Not on file   Social History Narrative    Not on file     Allergies   Allergen Reactions    Codeine     Morphine     Pollen Extract     Sulfa Antibiotics        Objective     Vital Signs  BP: 141/82      HR:70  T: 97 0   RR: 18 O2Sat: 96% RA   General: NAD, frail, non-toxic  Neck: Supple, +ROM  Oropharynx: Unable to assess  CV: S1, S2, no murmurs  Pulmonary: Lung sounds diminished in the bases, no wheezing, rhonchi, rales, moist non-productive cough noted  Abdominal:BS + x4 in all quadrants, soft, no mass, no tenderness  Extremities: No edema, +ROM, +Strength  Neurological: CN 2-12 intact, PERRL  Psych: Awake, alert,  Non-verbal    Pertinent Laboratory/Diagnostic Studies:  Medical records reviewed in NH medical chart,  no recent labs or diagnostic studies, patient on hospice care  Current Medications     Current Medications Reviewed and updated in Nursing Home EMR      Assessment and Plan     Dementia  - Patient showing decline  - Continue supportive care  - Monitor for s/s of Delirium    Bronchitis  - S/p empiric treatment with Azithromycin with improvement in signs and symptoms  - Continue duo-nebs and tussin cough medicine prn        HTN (hypertension)  - B/P's controlled  - Continue lisinopril      Ambulatory dysfunction  - Patient non-ambulatory  - Continue assistance from bed to chair  - Continue fall precautions    Chronic constipation  - Stable, continue current bowel regime  - Encourage PO fluid and fiber intake    Dysphagia  - Significant dysphagia most likely related to advanced dementia  - Evaluated by speech therapy   - Continue modified diet and aspiration precautions  - Elevate HOB at all times when eating       GERD (gastroesophageal reflux disease)  - Unknown severity related to advanced dementia  - Continue Nexium for comfort    Cough  - Cough continues with eating, related to dysphagia  - Continue dysphagia recommendations  - Continue prn cough medication and duo-nebs    Physical deconditioning  - Multi-factorial related to increased age and multiple co-morbidities  - Monitor skin integrity  - Continue to encourage PO food and fluid intake, especially protein consumption  - Continue supportive care and fall precautions  - Continue hospice level of care      [address] [place] [address]  2019       Name: Thor Hensley  : 1935  MRN: [id number]  DOS: 2019  Billing Code: [id number],  [address]

## 2019-07-07 NOTE — ASSESSMENT & PLAN NOTE
- Multi-factorial related to increased age and multiple co-morbidities  - Monitor skin integrity  - Continue to encourage PO food and fluid intake, especially protein consumption  - Continue supportive care and fall precautions  - Continue hospice level of care

## 2019-07-07 NOTE — ASSESSMENT & PLAN NOTE
- Significant dysphagia most likely related to advanced dementia  - Evaluated by speech therapy   - Continue modified diet and aspiration precautions  - Elevate HOB at all times when eating

## 2019-07-07 NOTE — ASSESSMENT & PLAN NOTE
- Cough continues with eating, related to dysphagia  - Continue dysphagia recommendations  - Continue prn cough medication and duo-nebs

## 2019-07-07 NOTE — ASSESSMENT & PLAN NOTE
- S/p empiric treatment with Azithromycin with improvement in signs and symptoms  - Continue duo-nebs and tussin cough medicine prn

## 2019-08-19 ENCOUNTER — NURSING HOME VISIT (OUTPATIENT)
Dept: GERIATRICS | Facility: OTHER | Age: 84
End: 2019-08-19
Payer: MEDICARE

## 2019-08-19 DIAGNOSIS — E43 SEVERE PROTEIN-CALORIE MALNUTRITION (HCC): ICD-10-CM

## 2019-08-19 DIAGNOSIS — R13.10 DYSPHAGIA, UNSPECIFIED TYPE: ICD-10-CM

## 2019-08-19 DIAGNOSIS — K21.9 GASTROESOPHAGEAL REFLUX DISEASE WITHOUT ESOPHAGITIS: ICD-10-CM

## 2019-08-19 DIAGNOSIS — K59.09 CHRONIC CONSTIPATION: ICD-10-CM

## 2019-08-19 DIAGNOSIS — F02.80 DEMENTIA ASSOCIATED WITH OTHER UNDERLYING DISEASE WITHOUT BEHAVIORAL DISTURBANCE (HCC): Primary | ICD-10-CM

## 2019-08-19 DIAGNOSIS — I10 ESSENTIAL HYPERTENSION: ICD-10-CM

## 2019-08-19 DIAGNOSIS — G89.29 OTHER CHRONIC PAIN: ICD-10-CM

## 2019-08-19 PROBLEM — J18.9 PNEUMONIA: Status: RESOLVED | Noted: 2019-05-31 | Resolved: 2019-08-19

## 2019-08-19 PROCEDURE — 99308 SBSQ NF CARE LOW MDM 20: CPT | Performed by: FAMILY MEDICINE

## 2019-08-19 NOTE — PROGRESS NOTES
[facility]  [address] [facility]  [address] [facility]  [phone]    Formerly [facility], 1928–1983  PROGRESS NOTE        NAME: Sherine Ross  AGE: 82 y o  SEX: male  :  1935  DATE OF ENCOUNTER:   2019  POS: 28 (LTC)    Assessment and Plan     Dementia  · Continues to show decline over time  · Continue supportive care, assistance with all ADLs at S  · Patient on hospice care, remains comfortable  Dysphagia  · In the setting of advanced dementia  · Continue supportive care, aspiration precautions and modified diet as appropriate  GERD (gastroesophageal reflux disease)  · Continue Nexium  Chronic constipation  · Continue current bowel regimen  · Encourage appropriate hydration  HTN (hypertension)  · BP stable and well controlled  · Continue lisinopril 20 mg po daily  · No further blood work to monitor renal function/electrolytes, patient on hospice care  Chronic pain  · Seems comfortable on exam   · Continue Gabapentin  Protein calorie malnutrition ([address] )  · In the setting of advanced dementia  · Continue pleasure feeds, patient currently on hospice  Padma Dai MD  Geriatric Medicine  2019       Chief Complaint   Patient seen and examined for follow up on chronic conditions  History of Present Illness     Mr Hipolito Villafuerte is an 81 yo M with PMHX of advanced dementia, now on hospice services  He is a long term care resident at [facility]  Mr Hipolito Villafuerte has continued to decline over time  Per staff, some behaviors with cares persist, otherwise patient is comfortable  He is a very poor historian due to underlying dementia  Unable to provide ROS         The following portions of the patient's history were reviewed and updated as appropriate: allergies, current medications, past family history, past medical history, past social history, past surgical history and problem list     Review of Systems     Unable to obtain ROS due to advanced dementia  History     Past Medical History:   Diagnosis Date    Cancer ([facility] )     prostate    Dementia     Ehrlichiosis     GERD (gastroesophageal reflux disease)     Hematuria     Hypertension     Lyme disease     Neoplasm of prostate      Allergies   Allergen Reactions    Codeine     Morphine     Pollen Extract     Sulfa Antibiotics        Objective     Vital Signs  BP:   137/81       HR:  91 T:  96 5°    RR:  18 O2Sat:  93% on RA W:  166 2 lb    Physical Exam   Constitutional: He appears well-developed  No distress  Looks chronically ill, frail and deconditioned  HENT:   Head: Normocephalic and atraumatic  Mouth/Throat: Oropharynx is clear and moist  No oropharyngeal exudate  Eyes: Pupils are equal, round, and reactive to light  Conjunctivae are normal  No scleral icterus  Neck: Neck supple  No JVD present  No thyromegaly present  Cardiovascular: Normal rate, regular rhythm and normal heart sounds  No murmur heard  Pulmonary/Chest: Effort normal and breath sounds normal  No respiratory distress  Abdominal: Soft  Bowel sounds are normal  He exhibits no distension  There is no tenderness  Musculoskeletal: He exhibits no edema, tenderness or deformity  Neurological: He is alert  He has normal reflexes  He displays normal reflexes  No cranial nerve deficit  He exhibits normal muscle tone  Minimally interactive  Does not answer questions appropriately, does not follow single step commands  Skin: Skin is warm and dry  No rash noted  He is not diaphoretic  No erythema  No pallor  Psychiatric:   Unable to assess due to advanced dementia  Vitals reviewed  Pertinent Laboratory/Diagnostic Studies     No further BW  Patient is on hospice  Current Medications     Current Medications Reviewed and updated in EMR  Monthly orders reviewed and signed in chart  Please note:  Voice-recognition software may have been used in the preparation of this document  Occasional wrong word or "sound-alike" substitutions may have occurred due to the inherent limitations of voice recognition software  Interpretation should be guided by context

## 2019-08-19 NOTE — ASSESSMENT & PLAN NOTE
· In the setting of advanced dementia  · Continue supportive care, aspiration precautions and modified diet as appropriate

## 2019-08-19 NOTE — ASSESSMENT & PLAN NOTE
· BP stable and well controlled  · Continue lisinopril 20 mg po daily  · No further blood work to monitor renal function/electrolytes, patient on hospice care

## 2019-08-19 NOTE — ASSESSMENT & PLAN NOTE
· Continues to show decline over time  · Continue supportive care, assistance with all ADLs at [facility]  · Patient on hospice care, remains comfortable

## 2019-09-19 ENCOUNTER — NURSING HOME VISIT (OUTPATIENT)
Dept: GERIATRICS | Facility: OTHER | Age: 84
End: 2019-09-19
Payer: MEDICARE

## 2019-09-19 DIAGNOSIS — E43 SEVERE PROTEIN-CALORIE MALNUTRITION (HCC): ICD-10-CM

## 2019-09-19 DIAGNOSIS — I10 ESSENTIAL HYPERTENSION: ICD-10-CM

## 2019-09-19 DIAGNOSIS — R26.2 AMBULATORY DYSFUNCTION: ICD-10-CM

## 2019-09-19 DIAGNOSIS — R13.10 DYSPHAGIA, UNSPECIFIED TYPE: ICD-10-CM

## 2019-09-19 DIAGNOSIS — H61.23 BILATERAL IMPACTED CERUMEN: ICD-10-CM

## 2019-09-19 DIAGNOSIS — R53.81 PHYSICAL DECONDITIONING: ICD-10-CM

## 2019-09-19 DIAGNOSIS — K21.9 GASTROESOPHAGEAL REFLUX DISEASE WITHOUT ESOPHAGITIS: ICD-10-CM

## 2019-09-19 DIAGNOSIS — F02.80 DEMENTIA ASSOCIATED WITH OTHER UNDERLYING DISEASE WITHOUT BEHAVIORAL DISTURBANCE (HCC): Primary | ICD-10-CM

## 2019-09-19 DIAGNOSIS — K59.09 CHRONIC CONSTIPATION: ICD-10-CM

## 2019-09-19 DIAGNOSIS — R05.9 COUGH: ICD-10-CM

## 2019-09-19 PROCEDURE — 99309 SBSQ NF CARE MODERATE MDM 30: CPT | Performed by: NURSE PRACTITIONER

## 2019-09-19 NOTE — ASSESSMENT & PLAN NOTE
- Patient slowly declining  - Monitor skin integrity  - Turn and reposition frequently  - Encourage PO hydration  - Continue oral care

## 2019-09-19 NOTE — ASSESSMENT & PLAN NOTE
- Continues to show significant decline  - Continue assistance with all ADL's  - Monitor skin integrity  - Continue supportive care  - Continue current level of care in LTC on hospice services

## 2019-09-19 NOTE — ASSESSMENT & PLAN NOTE
- Continue dysphagia diet  - Levsin ordered atc to decrease amount of secretions  - Continue duo-nebs and cough medicaiton  - Perform chest PT as needed for increased secretions

## 2019-09-19 NOTE — ASSESSMENT & PLAN NOTE
- Patient non-ambulatory  - S/P recent fall related to increased tremor  - Pain medication adjusted to for comfort  - Continue assistance with ADL's and transfers  - Fall precautions advised

## 2019-09-19 NOTE — PROGRESS NOTES
[address]  [address] [facility]  [phone]  [address] [facility]   Progress Note  POS 32      NAME: Vangie Herndon  AGE: 82 y o  SEX: male  :  1935  DATE OF ENCOUNTER: 2019    Chief Complaint   Patient seen and examined for follow up on chronic conditions  History of Present Illness     Thai Vasquez is a 82year old male resident seen and examined to follow-up on chronic medical conditions in long term care  He has a history of advanced dementia and in long term care under hospice services, unable to provide a history  He is sitting in his chair calm, cooperative and in no acute distress  Nursing reports that over this past weekend, they were concerned that he may have aspirated  He started to cough more frequently and bringing up thick mucous  His temperature was monitored closely this week and he has remained afebrile  He still has increased mucous production  He also suffered a fall this past Monday with no injuries reported  His morphine was discontinued related to the fall and he was put on Oxyfast for pain  He was place on oxygen nasal cannula at 2 L for comfort  The following portions of the patient's history were reviewed and updated as appropriate: allergies, current medications, past family history, past medical history, past social history, past surgical history and problem list     Review of Systems     Unable to obtain related to advanced dementia      History     Past Medical History:   Diagnosis Date    Cancer ([address] )     prostate    Dementia     Ehrlichiosis     GERD (gastroesophageal reflux disease)     Hematuria     Hypertension     Lyme disease     Neoplasm of prostate      Past Surgical History:   Procedure Laterality Date    BLADDER SUSPENSION      HIP ARTHROPLASTY      SHOULDER SURGERY Right      Family History   Family history unknown: Yes     Social History     Socioeconomic History    Marital status:      Spouse name: Not on file    Number of children: Not on file    Years of education: Not on file    Highest education level: Not on file   Occupational History    Not on file   Social Needs    Financial resource strain: Not on file    Food insecurity:     Worry: Not on file     Inability: Not on file    Transportation needs:     Medical: Not on file     Non-medical: Not on file   Tobacco Use    Smoking status: Never Smoker    Smokeless tobacco: Never Used   Substance and Sexual Activity    Alcohol use: No    Drug use: No    Sexual activity: Not on file   Lifestyle    Physical activity:     Days per week: Not on file     Minutes per session: Not on file    Stress: Not on file   Relationships    Social connections:     Talks on phone: Not on file     Gets together: Not on file     Attends [employer] service: Not on file     Active member of club or organization: Not on file     Attends meetings of clubs or organizations: Not on file     Relationship status: Not on file    Intimate partner violence:     Fear of current or ex partner: Not on file     Emotionally abused: Not on file     Physically abused: Not on file     Forced sexual activity: Not on file   Other Topics Concern    Not on file   Social History Narrative    Not on file     Allergies   Allergen Reactions    Codeine     Morphine     Pollen Extract     Sulfa Antibiotics        Objective     Vital Signs  BP: 125/71    HR:69 T:97 1    RR:18 O2Sat:95% 2l n/c  General: Well developed, no distress, looks chronically ill, frail and deconditioned  Oral: Unable to assess related to advanced dementia  Ears: Large amount of cerumen in right ear, small amount of cerumen  in the left ear  Neck: Supple, +ROM  CV: S1, S2, no murmurs  Pulmonary: Lung sounds clear to air, no wheezing, rhonchi, rales  Abdominal:BS + x4 in all quadrants, soft, no mass, no tenderness  Extremities: No edema, +ROM, +Strength, + rigidity  Neurological: Alert, MAGDALENA , non-verbal during exam, he does not answer questions appropriately or follow singe step commands  Psych: Unable to assess related to advanced dementia    Pertinent Laboratory/Diagnostic Studies:  Patient under hospice services, no further lab or diagnostic studies performed      Current Medications     Current Medications Reviewed and updated in Nursing Home EMR      Assessment and Plan     Dementia  - Continues to show significant decline  - Continue assistance with all ADL's  - Monitor skin integrity  - Continue supportive care  - Continue current level of care in LTC on hospice services    HTN (hypertension)  - Blood pressure controlled  - Continue lisinopril 20 mg PO daily  - No further blood work indicated related to hospice level of care    Dysphagia  - Possible aspiration this past weekend, increased secretions noted  - Will change Levsin to atc  - Feed only when awake and alert and sitting at 90 degrees  - Continue aspiration precautions    Chronic constipation  - No acute issues reported  - Continue current bowel regime  - Encourage PO fluid and fiber inatke    GERD (gastroesophageal reflux disease)  - No acute issues reported  - Will continue PPI for comfort    Ambulatory dysfunction  - Patient non-ambulatory  - S/P recent fall related to increased tremor  - Pain medication adjusted to for comfort  - Continue assistance with ADL's and transfers  - Fall precautions advised    Cough  - Continue dysphagia diet  - Levsin ordered atc to decrease amount of secretions  - Continue duo-nebs and cough medicaiton  - Perform chest PT as needed for increased secretions    Physical deconditioning  - Patient slowly declining  - Monitor skin integrity  - Turn and reposition frequently  - Encourage PO hydration  - Continue oral care    Protein calorie malnutrition (HCC)  - In the setting of advanced dementia  - Continue pleasure feeds  - Continue current level of care under hospice services      Bilateral impacted cerumen  - Right ear cerumen greater than the left ear  - He is unable to tolerate ear gtts or ear irrigation  - Removed a small amount of cerumen to bilateral ears with a curette, however there is some remaining in bilateral ear canals, he could not tolerate further removal     - Will continue to monitor for worsening s/sx      [address] [facility]  9/19/2019

## 2019-09-19 NOTE — ASSESSMENT & PLAN NOTE
- Possible aspiration this past weekend, increased secretions noted  - Will change Levsin to atc  - Feed only when awake and alert and sitting at 90 degrees  - Continue aspiration precautions

## 2019-09-19 NOTE — ASSESSMENT & PLAN NOTE
- Right ear cerumen greater than the left ear  - He is unable to tolerate ear gtts or ear irrigation  - Removed a small amount of cerumen to bilateral ears with a curette, however there is some remaining in bilateral ear canals, he could not tolerate further removal     - Will continue to monitor for worsening s/sx

## 2019-09-19 NOTE — ASSESSMENT & PLAN NOTE
- Blood pressure controlled  - Continue lisinopril 20 mg PO daily  - No further blood work indicated related to hospice level of care

## 2019-09-19 NOTE — ASSESSMENT & PLAN NOTE
- In the setting of advanced dementia  - Continue pleasure feeds  - Continue current level of care under hospice services

## 2019-10-22 ENCOUNTER — NURSING HOME VISIT (OUTPATIENT)
Dept: GERIATRICS | Facility: OTHER | Age: 84
End: 2019-10-22
Payer: MEDICARE

## 2019-10-22 DIAGNOSIS — R26.2 AMBULATORY DYSFUNCTION: ICD-10-CM

## 2019-10-22 DIAGNOSIS — K21.9 GASTROESOPHAGEAL REFLUX DISEASE WITHOUT ESOPHAGITIS: ICD-10-CM

## 2019-10-22 DIAGNOSIS — E43 SEVERE PROTEIN-CALORIE MALNUTRITION (HCC): ICD-10-CM

## 2019-10-22 DIAGNOSIS — R13.10 DYSPHAGIA, UNSPECIFIED TYPE: ICD-10-CM

## 2019-10-22 DIAGNOSIS — I10 ESSENTIAL HYPERTENSION: ICD-10-CM

## 2019-10-22 DIAGNOSIS — F02.80 DEMENTIA ASSOCIATED WITH OTHER UNDERLYING DISEASE WITHOUT BEHAVIORAL DISTURBANCE (HCC): Primary | ICD-10-CM

## 2019-10-22 DIAGNOSIS — G89.29 OTHER CHRONIC PAIN: ICD-10-CM

## 2019-10-22 DIAGNOSIS — K59.09 CHRONIC CONSTIPATION: ICD-10-CM

## 2019-10-22 PROCEDURE — 99309 SBSQ NF CARE MODERATE MDM 30: CPT | Performed by: FAMILY MEDICINE

## 2019-10-22 NOTE — ASSESSMENT & PLAN NOTE
· Patient continues to show some decline  · Continue to provide assistance with all ADLs including all transfers and meals  · Continue supportive care  · Patient under hospice care

## 2019-10-22 NOTE — ASSESSMENT & PLAN NOTE
· Continue lisinopril, BP stable and well controlled  · No further blood work as patient is under hospice services

## 2019-10-22 NOTE — PROGRESS NOTES
[facility]  [address] [facility]  [address] [facility]  [phone]    [facility]  PROGRESS NOTE        NAME: Princess Lora  AGE: 82 y o  SEX: male  :  1935  DATE OF ENCOUNTER:   10/22/2019  POS: 28 (LTC)    Assessment and Plan     Dementia  · Patient continues to show some decline  · Continue to provide assistance with all ADLs including all transfers and meals  · Continue supportive care  · Patient under hospice care  Protein calorie malnutrition ([address] )  · In the setting of advanced dementia  · Continue pleasure feeds  Patient currently on hospice  Dysphagia  · Continue pleasure feeds  · Continue aspiration precautions  Ambulatory dysfunction  · Non-ambulatory at baseline  · Continue assistance with all ADLs and transfers  GERD (gastroesophageal reflux disease)  · Continue Nexium    Chronic constipation  · Currently stable  · Continue current bowel regimen  HTN (hypertension)  · Continue lisinopril, BP stable and well controlled  · No further blood work as patient is under hospice services  Chronic pain  · Seems comfortable on exam   · Continue Gabapentin at current dose  Kaylan Faith MD  Geriatric Medicine  10/22/2019       Chief Complaint   Patient seen and examined for follow up on chronic conditions  History of Present Illness     81 yo M with underlying hx of advanced dementia  he is a long term care resident at [address] [place] [address] under hospice care due to advanced dementia  At baseline, "Radha Aleman" does not ambulate and requires max assistance with all ADLs including transfers and feeds  Per staff, he continues with few behaviors with cares  Otherwise comfortable  Unable to obtain history or ROS from patient due to advanced dementia and severe communication deficts         The following portions of the patient's history were reviewed and updated as appropriate: allergies, current medications, past family history, past medical history, past social history, past surgical history and problem list     Review of Systems     Unable to obtain ROS due to advanced dementia and significant communication skill impairment  History     Past Medical History:   Diagnosis Date    Cancer [facility])     prostate    Dementia     Ehrlichiosis     GERD (gastroesophageal reflux disease)     Hematuria     Hypertension     Lyme disease     Neoplasm of prostate      Allergies   Allergen Reactions    Codeine     Morphine     Pollen Extract     Sulfa Antibiotics        Objective     Vital Signs  BP:   121/59       HR:  74 T:  96 7 degrees    RR:  18 O2Sat:  94% on RA W:  No further weight checks    Physical Exam  GEN: NAD  Non-toxic appearing  Looks frail, underweight and very deconditioned  Chronically ill  Generalized muscle wasting noted throughout  HEENT: NC/AT  MAGDALENA  EIOMI  Oropharynx moist and clear  No oral lesions  CV: S1, S2   RRR, regular rate  No murmur appreciated  PULM: Non-labored respirations  CTA bilaterally  Poor effort  ABD: Soft, NT/ND  BSx4  EXT: No peripheral edema  NEURO:  Awake, alert  Unable to answer any questions  He does follow few single step commands today  Current Medications     Current Medications Reviewed and updated in EMR  Monthly orders reviewed and signed in chart  Please note:  Voice-recognition software may have been used in the preparation of this document  Occasional wrong word or "sound-alike" substitutions may have occurred due to the inherent limitations of voice recognition software  Interpretation should be guided by context

## 2019-11-27 ENCOUNTER — NURSING HOME VISIT (OUTPATIENT)
Dept: GERIATRICS | Facility: OTHER | Age: 84
End: 2019-11-27
Payer: MEDICARE

## 2019-11-27 DIAGNOSIS — K21.9 GASTROESOPHAGEAL REFLUX DISEASE WITHOUT ESOPHAGITIS: ICD-10-CM

## 2019-11-27 DIAGNOSIS — R53.81 PHYSICAL DECONDITIONING: ICD-10-CM

## 2019-11-27 DIAGNOSIS — E43 SEVERE PROTEIN-CALORIE MALNUTRITION (HCC): ICD-10-CM

## 2019-11-27 DIAGNOSIS — R13.10 DYSPHAGIA, UNSPECIFIED TYPE: ICD-10-CM

## 2019-11-27 DIAGNOSIS — F02.80 DEMENTIA ASSOCIATED WITH OTHER UNDERLYING DISEASE WITHOUT BEHAVIORAL DISTURBANCE (HCC): ICD-10-CM

## 2019-11-27 DIAGNOSIS — H61.23 BILATERAL IMPACTED CERUMEN: ICD-10-CM

## 2019-11-27 DIAGNOSIS — I10 ESSENTIAL HYPERTENSION: Primary | ICD-10-CM

## 2019-11-27 PROCEDURE — 99309 SBSQ NF CARE MODERATE MDM 30: CPT | Performed by: NURSE PRACTITIONER

## 2019-11-28 NOTE — PROGRESS NOTES
[address]  [address] [facility]  [phone]  [address] [facility]   Progress Note  POS 32      NAME: Daryn Martins  AGE: 82 y o  SEX: male  :  1935  DATE OF ENCOUNTER: 2019    Chief Complaint   Patient seen and examined for follow up on chronic conditions  History of Present Illness       Hugh Jimenez is a 82year old male resident seen and examined to follow-up on chronic medical conditions in long term care  He has a history of advanced dementia and in long term care under hospice services, unable to provide a history  He is sitting in his chair calm, cooperative and in no acute distress  He is non-ambulatory at baseline and a full assist with ADL's  No acute issues reported by nursing  The following portions of the patient's history were reviewed and updated as appropriate: allergies, current medications, past family history, past medical history, past social history, past surgical history and problem list     Review of Systems     A  review of systems was performed  All negative, except as per HPI      History     Past Medical History:   Diagnosis Date    [facility] )     prostate    Dementia ([facility] )     Ehrlichiosis     GERD (gastroesophageal reflux disease)     Hematuria     Hypertension     Lyme disease     Neoplasm of prostate      Past Surgical History:   Procedure Laterality Date    BLADDER SUSPENSION      HIP ARTHROPLASTY      SHOULDER SURGERY Right      Family History   Family history unknown: Yes     Social History     Socioeconomic History    Marital status:      Spouse name: Not on file    Number of children: Not on file    Years of education: Not on file    Highest education level: Not on file   Occupational History    Not on file   Social Needs    Financial resource strain: Not on file    Food insecurity:     Worry: Not on file     Inability: Not on file    Transportation needs:     Medical: Not on file Non-medical: Not on file   Tobacco Use    Smoking status: Never Smoker    Smokeless tobacco: Never Used   Substance and Sexual Activity    Alcohol use: No    Drug use: No    Sexual activity: Not on file   Lifestyle    Physical activity:     Days per week: Not on file     Minutes per session: Not on file    Stress: Not on file   Relationships    Social connections:     Talks on phone: Not on file     Gets together: Not on file     Attends Baptism service: Not on file     Active member of club or organization: Not on file     Attends meetings of clubs or organizations: Not on file     Relationship status: Not on file    Intimate partner violence:     Fear of current or ex partner: Not on file     Emotionally abused: Not on file     Physically abused: Not on file     Forced sexual activity: Not on file   Other Topics Concern    Not on file   Social History Narrative    Not on file     Allergies   Allergen Reactions    Codeine     Morphine     Pollen Extract     Sulfa Antibiotics        Objective     Vital Signs  BP: 89/59   HR: 80 T: 97 1    RR: 16 O2Sat: 97% RA   General: NAD, frail, deconditioned  Oral: Oropharynx  clear  Neck: Supple, +ROM  Ears: Bilateral cerumen impaction   CV: S1, S2, no murmurs  Pulmonary: Lung sounds clear to air, no wheezing, rhonchi, rales, poor effort  Abdominal:BS + x4 in all quadrants, soft, no mass, no tenderness  Extremities: No edema, +ROM, +Strength  Neurological: CN 2-12 intact, MAGDALENA  Psych: Awake, alert and oriented to self, states name, disoriented to time and place    Pertinent Laboratory/Diagnostic Studies:  Patient under hospice services, no further lab or diagnostic studies indicated      Current Medications     Current Medications Reviewed and updated in Nursing Home EMR      Assessment and Plan     HTN (hypertension)  - BP low today however was elevated on 11/25/19 at 171/84  - Continue current dose of lisinopril, will monitor and decrease dose or discontinue if bp's remain low      GERD (gastroesophageal reflux disease)  - Stable, continue PPI for comfort    Dysphagia  - Stable, continue pleasure feeds  - Aspiration precautions advised    Dementia  - Patient continues to show decline  - Continue current level of care in LTC receiving hospice services  - Continue assistance with ADL's, tranfers and feeding  - Continue supportive care    Protein calorie malnutrition ([facility] )  - In the setting of advanced dementia  - Continue pleasure feeds and hospice level of care    Physical deconditioning  - Multifactorial related to increased age and multiple co-morbidities including advanced dementia  - Monitor skin integrity  - Turn and reposition frequently  - Encourage PO hydration  - Continue oral care    Bilateral impacted cerumen  - Bilateral cerumen impaction noted on exam  - Patient unable to tolerate ear irrigation  - Will order Debrox treatment for 7 days to bilateral ears  - Will continue to monitor      [facility]  11/27/2019

## 2019-11-28 NOTE — ASSESSMENT & PLAN NOTE
- BP low today however was elevated on 11/25/19 at 171/84  - Continue current dose of lisinopril, will monitor and decrease dose or discontinue if bp's remain low

## 2019-11-28 NOTE — ASSESSMENT & PLAN NOTE
- Bilateral cerumen impaction noted on exam  - Patient unable to tolerate ear irrigation  - Will order Debrox treatment for 7 days to bilateral ears  - Will continue to monitor

## 2019-11-28 NOTE — ASSESSMENT & PLAN NOTE
- Patient continues to show decline  - Continue current level of care in LTC receiving hospice services  - Continue assistance with ADL's, tranfers and feeding  - Continue supportive care

## 2019-11-28 NOTE — ASSESSMENT & PLAN NOTE
- Multifactorial related to increased age and multiple co-morbidities including advanced dementia  - Monitor skin integrity  - Turn and reposition frequently  - Encourage PO hydration  - Continue oral care

## 2020-01-09 ENCOUNTER — NURSING HOME VISIT (OUTPATIENT)
Dept: GERIATRICS | Facility: OTHER | Age: 85
End: 2020-01-09
Payer: MEDICARE

## 2020-01-09 DIAGNOSIS — G89.29 OTHER CHRONIC PAIN: ICD-10-CM

## 2020-01-09 DIAGNOSIS — R13.10 DYSPHAGIA, UNSPECIFIED TYPE: ICD-10-CM

## 2020-01-09 DIAGNOSIS — E78.2 MIXED HYPERLIPIDEMIA: ICD-10-CM

## 2020-01-09 DIAGNOSIS — K21.9 GASTROESOPHAGEAL REFLUX DISEASE WITHOUT ESOPHAGITIS: ICD-10-CM

## 2020-01-09 DIAGNOSIS — R39.81 FUNCTIONAL URINARY INCONTINENCE: ICD-10-CM

## 2020-01-09 DIAGNOSIS — K59.09 CHRONIC CONSTIPATION: ICD-10-CM

## 2020-01-09 DIAGNOSIS — F02.80 DEMENTIA ASSOCIATED WITH OTHER UNDERLYING DISEASE WITHOUT BEHAVIORAL DISTURBANCE (HCC): Primary | ICD-10-CM

## 2020-01-09 DIAGNOSIS — R26.2 AMBULATORY DYSFUNCTION: ICD-10-CM

## 2020-01-09 DIAGNOSIS — I10 ESSENTIAL HYPERTENSION: ICD-10-CM

## 2020-01-09 DIAGNOSIS — E43 SEVERE PROTEIN-CALORIE MALNUTRITION (HCC): ICD-10-CM

## 2020-01-09 PROBLEM — H61.23 BILATERAL IMPACTED CERUMEN: Status: RESOLVED | Noted: 2019-09-19 | Resolved: 2020-01-09

## 2020-01-09 PROBLEM — R50.9 FEVER: Status: RESOLVED | Noted: 2019-06-20 | Resolved: 2020-01-09
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
## 2020-01-09 PROBLEM — R05.9 COUGH: Status: RESOLVED | Noted: 2019-06-18 | Resolved: 2020-01-09

## 2020-01-09 PROBLEM — R31.9 HEMATURIA: Status: RESOLVED | Noted: 2019-06-18 | Resolved: 2020-01-09

## 2020-01-09 PROCEDURE — 99309 SBSQ NF CARE MODERATE MDM 30: CPT | Performed by: FAMILY MEDICINE

## 2020-01-09 NOTE — ASSESSMENT & PLAN NOTE
· Very advanced  · Patient is nonambulatory at baseline, has severe communication deficits and is minimally verbal   He requires assistance with all ADLs including all transfers and feeds  · Continue supportive care and assistance with all ADLs  · Patient continues under hospice care

## 2020-01-09 NOTE — ASSESSMENT & PLAN NOTE
· Blood pressure fairly stable  · Continue current dose of lisinopril  · No further workup as patient is under hospice care

## 2020-01-09 NOTE — ASSESSMENT & PLAN NOTE
· In the setting of advanced dementia  · Continue pleasure feeds  · Patient currently under hospice care

## 2020-01-09 NOTE — PROGRESS NOTES
[facility]  [address]  [address] [facility]  [phone]    [facility]  PROGRESS NOTE        NAME: Rebeca Catherine  AGE: 82 y o  SEX: male  :  1935  DATE OF ENCOUNTER:   2020  POS: 28 (LTC)    Assessment and Plan     Dementia  · Very advanced  · Patient is nonambulatory at baseline, has severe communication deficits and is minimally verbal   He requires assistance with all ADLs including all transfers and feeds  · Continue supportive care and assistance with all ADLs  · Patient continues under hospice care  Protein calorie malnutrition ([facility] [address] )  · In the setting of advanced dementia  · Continue pleasure feeds  · Patient currently under hospice care  Dysphagia  · Continue pleasure feeds  · Continue aspiration precautions  Chronic constipation  · Currently stable, having a bowel movement every other day  · Continue bowel regimen  GERD (gastroesophageal reflux disease)  · Continue PPI  HTN (hypertension)  · Blood pressure fairly stable  · Continue current dose of lisinopril  · No further workup as patient is under hospice care  Ambulatory dysfunction  · Nonambulatory at baseline  · Continue assistance with all transfers and fall precautions  Chronic pain  · No evidence of pain on physical exam   · Continue gabapentin at current dose  HLD (hyperlipidemia)  · Not on statin, patient under hospice care    Incontinence  · Continue supportive care with frequent toileting  · Keep patient clean and dry at all times  Alexx Stevens MD  Geriatric Medicine  2020       Chief Complaint   Patient seen and examined for follow up on chronic conditions       History of Present Illness     74-year-old gentleman with underlying advanced dementia, severe protein calorie malnutrition, dysphagia, ambulatory dysfunction, GERD, chronic constipation, hypertension, chronic pain who is a long-term care resident under hospice services at [address] [place] [address] [address]  Today, patient seen and examined for follow-up on acute and chronic conditions  He is sitting comfortably in the chair  Very difficult to arouse during visit  Unable to provide ROS, unable to answer any questions or follow any commands  Per staff, patient has been sleeping more lately  He continues to exhibit some mild behaviors or agitation especially during cares  The following portions of the patient's history were reviewed and updated as appropriate: allergies, current medications, past family history, past medical history, past social history, past surgical history and problem list     Review of Systems     Unable to obtain ROS due to advanced dementia and severe communication deficits  History     Past Medical History:   Diagnosis Date    Cancer ([facility] )     prostate    Dementia ([facility] )     Ehrlichiosis     GERD (gastroesophageal reflux disease)     Hematuria     Hypertension     Lyme disease     Neoplasm of prostate      Allergies   Allergen Reactions    Codeine     Morphine     Pollen Extract     Sulfa Antibiotics        Objective     Vital Signs  BP:   138/80       HR:  89 T:  97 1°    RR:  17 O2Sat:  95% on RA W:  166 2 lb    Physical Exam    GEN: NAD  Non-toxic appearing  Looks frail, underweight and chronically ill  HEENT: NC/AT  Oropharynx moist and clear  No oral lesions  CV: S1, S2   RRR  No murmur appreciated  PULM: Non-labored respirations  CTA bilaterally  ABD: Soft, NT/ND  BSx4  EXT: No peripheral edema  NEURO:  Sleeping and difficult to arouse  Unable to answer any questions or follow any commands  Pertinent Laboratory/Diagnostic Studies     No further blood work as patient is under hospice care  Current Medications     Current Medications Reviewed and updated in EMR  Monthly orders reviewed and signed in chart  Please note:  Voice-recognition software may have been used in the preparation of this document    Occasional wrong word or "sound-alike" substitutions may have occurred due to the inherent limitations of voice recognition software  Interpretation should be guided by context

## 2020-01-26 ENCOUNTER — TELEPHONE (OUTPATIENT)
Dept: OTHER | Facility: OTHER | Age: 85
End: 2020-01-26
